# Patient Record
Sex: MALE | Race: OTHER | HISPANIC OR LATINO | URBAN - METROPOLITAN AREA
[De-identification: names, ages, dates, MRNs, and addresses within clinical notes are randomized per-mention and may not be internally consistent; named-entity substitution may affect disease eponyms.]

---

## 2017-07-19 ENCOUNTER — OUTPATIENT (OUTPATIENT)
Dept: OUTPATIENT SERVICES | Facility: HOSPITAL | Age: 67
LOS: 1 days | End: 2017-07-19
Payer: MEDICARE

## 2017-07-19 VITALS
TEMPERATURE: 98 F | HEIGHT: 65 IN | WEIGHT: 190.04 LBS | SYSTOLIC BLOOD PRESSURE: 128 MMHG | OXYGEN SATURATION: 98 % | RESPIRATION RATE: 16 BRPM | HEART RATE: 48 BPM | DIASTOLIC BLOOD PRESSURE: 70 MMHG

## 2017-07-19 DIAGNOSIS — J45.909 UNSPECIFIED ASTHMA, UNCOMPLICATED: ICD-10-CM

## 2017-07-19 DIAGNOSIS — K40.30 UNILATERAL INGUINAL HERNIA, WITH OBSTRUCTION, WITHOUT GANGRENE, NOT SPECIFIED AS RECURRENT: ICD-10-CM

## 2017-07-19 DIAGNOSIS — I44.7 LEFT BUNDLE-BRANCH BLOCK, UNSPECIFIED: ICD-10-CM

## 2017-07-19 DIAGNOSIS — Z98.890 OTHER SPECIFIED POSTPROCEDURAL STATES: Chronic | ICD-10-CM

## 2017-07-19 DIAGNOSIS — R06.83 SNORING: ICD-10-CM

## 2017-07-19 DIAGNOSIS — K40.90 UNILATERAL INGUINAL HERNIA, WITHOUT OBSTRUCTION OR GANGRENE, NOT SPECIFIED AS RECURRENT: ICD-10-CM

## 2017-07-19 LAB
BUN SERPL-MCNC: 15 MG/DL — SIGNIFICANT CHANGE UP (ref 7–23)
CALCIUM SERPL-MCNC: 9.8 MG/DL — SIGNIFICANT CHANGE UP (ref 8.4–10.5)
CHLORIDE SERPL-SCNC: 98 MMOL/L — SIGNIFICANT CHANGE UP (ref 98–107)
CO2 SERPL-SCNC: 28 MMOL/L — SIGNIFICANT CHANGE UP (ref 22–31)
CREAT SERPL-MCNC: 1.06 MG/DL — SIGNIFICANT CHANGE UP (ref 0.5–1.3)
GLUCOSE SERPL-MCNC: 105 MG/DL — HIGH (ref 70–99)
HCT VFR BLD CALC: 49.3 % — SIGNIFICANT CHANGE UP (ref 39–50)
HGB BLD-MCNC: 16.2 G/DL — SIGNIFICANT CHANGE UP (ref 13–17)
MCHC RBC-ENTMCNC: 26.7 PG — LOW (ref 27–34)
MCHC RBC-ENTMCNC: 32.9 % — SIGNIFICANT CHANGE UP (ref 32–36)
MCV RBC AUTO: 81.4 FL — SIGNIFICANT CHANGE UP (ref 80–100)
NRBC # FLD: 0 — SIGNIFICANT CHANGE UP
PLATELET # BLD AUTO: 216 K/UL — SIGNIFICANT CHANGE UP (ref 150–400)
PMV BLD: 11.1 FL — SIGNIFICANT CHANGE UP (ref 7–13)
POTASSIUM SERPL-MCNC: 3 MMOL/L — LOW (ref 3.5–5.3)
POTASSIUM SERPL-SCNC: 3 MMOL/L — LOW (ref 3.5–5.3)
RBC # BLD: 6.06 M/UL — HIGH (ref 4.2–5.8)
RBC # FLD: 14.4 % — SIGNIFICANT CHANGE UP (ref 10.3–14.5)
SODIUM SERPL-SCNC: 142 MMOL/L — SIGNIFICANT CHANGE UP (ref 135–145)
WBC # BLD: 9.32 K/UL — SIGNIFICANT CHANGE UP (ref 3.8–10.5)
WBC # FLD AUTO: 9.32 K/UL — SIGNIFICANT CHANGE UP (ref 3.8–10.5)

## 2017-07-19 PROCEDURE — 93010 ELECTROCARDIOGRAM REPORT: CPT

## 2017-07-19 NOTE — H&P PST ADULT - PMH
Asthma  h/o  Cerebral aneurysm  2001  Depression with anxiety  following anuerysm  GERD (gastroesophageal reflux disease)    Hypertension    Inguinal hernia  b/l  LBBB (left bundle branch block)    Prostate disease  BPH   7/2011 hot and cold fusion Asthma  h/o  Cerebral aneurysm  2001  Depression with anxiety  following anuerysm  GERD (gastroesophageal reflux disease)    Hypertension    Inguinal hernia  b/l  LBBB (left bundle branch block)    Obesity    Prostate disease  BPH   7/2011 hot and cold fusion

## 2017-07-19 NOTE — H&P PST ADULT - NEGATIVE CARDIOVASCULAR SYMPTOMS
no chest pain/no claudication/no dyspnea on exertion/no paroxysmal nocturnal dyspnea/no orthopnea/no palpitations/no peripheral edema

## 2017-07-19 NOTE — H&P PST ADULT - PROBLEM SELECTOR PLAN 1
scheduled b/l inguinal hernia on 7/26/2017.  preop instructions, gi prophylaxis & surgical soap given  pt verbalized understanding

## 2017-07-19 NOTE — H&P PST ADULT - LYMPHATIC
posterior cervical R/supraclavicular L/posterior cervical L/anterior cervical R/anterior cervical L/supraclavicular R

## 2017-07-19 NOTE — H&P PST ADULT - HISTORY OF PRESENT ILLNESS
66 y/o male presents for preop eval for scheduled b/l inguinal hernia on 7/26/2017.  pt states h/o lower back pain, recent imaging revealed b/l inguinal hernia & gallstones.

## 2017-07-19 NOTE — H&P PST ADULT - FAMILY HISTORY
Father  Still living? No  Family history of lung cancer, Age at diagnosis: Age Unknown     Mother  Still living? No  Family history of diabetes mellitus, Age at diagnosis: Age Unknown     Sibling  Still living? Yes, Estimated age: Age Unknown  Family history of diabetes mellitus, Age at diagnosis: Age Unknown  Family history of emphysema, Age at diagnosis: Age Unknown  Family history of prostate cancer, Age at diagnosis: Age Unknown  Family history of renal disease, Age at diagnosis: Age Unknown  Family history of stroke, Age at diagnosis: Age Unknown

## 2017-07-19 NOTE — H&P PST ADULT - PROBLEM SELECTOR PLAN 2
h/o LBBB, followed by Dr Longoria (cardiologist).  HR 48  cardiology eval recommended  pending copy of cardiology eval  pt instructed to take atenolol on morning of surgery. Pt instructed to confirm with cardiology.

## 2017-07-25 NOTE — ASU PATIENT PROFILE, ADULT - PMH
Asthma  h/o  Cerebral aneurysm  2001  Depression with anxiety  following anuerysm  GERD (gastroesophageal reflux disease)    Hypertension    Inguinal hernia  b/l  LBBB (left bundle branch block)    Obesity    Prostate disease  BPH   7/2011 hot and cold fusion

## 2017-07-26 ENCOUNTER — OUTPATIENT (OUTPATIENT)
Dept: OUTPATIENT SERVICES | Facility: HOSPITAL | Age: 67
LOS: 1 days | Discharge: HOME | End: 2017-07-26
Payer: MEDICARE

## 2017-07-26 ENCOUNTER — RESULT REVIEW (OUTPATIENT)
Age: 67
End: 2017-07-26

## 2017-07-26 ENCOUNTER — TRANSCRIPTION ENCOUNTER (OUTPATIENT)
Age: 67
End: 2017-07-26

## 2017-07-26 VITALS
SYSTOLIC BLOOD PRESSURE: 144 MMHG | OXYGEN SATURATION: 100 % | RESPIRATION RATE: 14 BRPM | DIASTOLIC BLOOD PRESSURE: 61 MMHG | HEART RATE: 58 BPM

## 2017-07-26 VITALS
WEIGHT: 190.04 LBS | HEART RATE: 54 BPM | OXYGEN SATURATION: 97 % | HEIGHT: 65 IN | DIASTOLIC BLOOD PRESSURE: 69 MMHG | RESPIRATION RATE: 12 BRPM | SYSTOLIC BLOOD PRESSURE: 132 MMHG | TEMPERATURE: 98 F

## 2017-07-26 DIAGNOSIS — K40.30 UNILATERAL INGUINAL HERNIA, WITH OBSTRUCTION, WITHOUT GANGRENE, NOT SPECIFIED AS RECURRENT: ICD-10-CM

## 2017-07-26 DIAGNOSIS — Z98.890 OTHER SPECIFIED POSTPROCEDURAL STATES: Chronic | ICD-10-CM

## 2017-07-26 LAB — POTASSIUM BLDV-SCNC: 3.7 MMOL/L — SIGNIFICANT CHANGE UP (ref 3.4–4.5)

## 2017-07-26 PROCEDURE — 88304 TISSUE EXAM BY PATHOLOGIST: CPT | Mod: 26

## 2017-07-26 RX ORDER — FLUTICASONE FUROATE AND VILANTEROL TRIFENATATE 100; 25 UG/1; UG/1
1 POWDER RESPIRATORY (INHALATION)
Qty: 0 | Refills: 0 | COMMUNITY

## 2017-07-26 RX ORDER — DOCUSATE SODIUM 100 MG
100 CAPSULE ORAL THREE TIMES A DAY
Qty: 0 | Refills: 0 | Status: DISCONTINUED | OUTPATIENT
Start: 2017-07-26 | End: 2017-07-27

## 2017-07-26 RX ORDER — OXYCODONE AND ACETAMINOPHEN 5; 325 MG/1; MG/1
1 TABLET ORAL EVERY 4 HOURS
Qty: 0 | Refills: 0 | Status: DISCONTINUED | OUTPATIENT
Start: 2017-07-26 | End: 2017-07-27

## 2017-07-26 RX ORDER — ERGOCALCIFEROL 1.25 MG/1
1 CAPSULE ORAL
Qty: 0 | Refills: 0 | COMMUNITY

## 2017-07-26 RX ORDER — SODIUM CHLORIDE 9 MG/ML
1000 INJECTION, SOLUTION INTRAVENOUS
Qty: 0 | Refills: 0 | Status: DISCONTINUED | OUTPATIENT
Start: 2017-07-26 | End: 2017-07-27

## 2017-07-26 RX ORDER — OMEPRAZOLE 10 MG/1
1 CAPSULE, DELAYED RELEASE ORAL
Qty: 0 | Refills: 0 | COMMUNITY

## 2017-07-26 RX ORDER — CHLORTHALIDONE 50 MG
1 TABLET ORAL
Qty: 0 | Refills: 0 | COMMUNITY

## 2017-07-26 RX ORDER — ESCITALOPRAM OXALATE 10 MG/1
1 TABLET, FILM COATED ORAL
Qty: 0 | Refills: 0 | COMMUNITY

## 2017-07-26 RX ORDER — OXYCODONE AND ACETAMINOPHEN 5; 325 MG/1; MG/1
2 TABLET ORAL EVERY 6 HOURS
Qty: 0 | Refills: 0 | Status: DISCONTINUED | OUTPATIENT
Start: 2017-07-26 | End: 2017-07-27

## 2017-07-26 RX ORDER — DOCUSATE SODIUM 100 MG
1 CAPSULE ORAL
Qty: 0 | Refills: 0 | COMMUNITY
Start: 2017-07-26

## 2017-07-26 RX ORDER — ATENOLOL 25 MG/1
1 TABLET ORAL
Qty: 0 | Refills: 0 | COMMUNITY

## 2017-07-26 NOTE — ASU DISCHARGE PLAN (ADULT/PEDIATRIC). - DRIVING
Do not drive if taking prescription pain medications Do not drive if taking prescription pain medications/No

## 2017-07-26 NOTE — ASU DISCHARGE PLAN (ADULT/PEDIATRIC). - NOTIFY
Pain not relieved by Medications/Unable to Urinate/Bleeding that does not stop/Fever greater than 101

## 2017-07-26 NOTE — ASU DISCHARGE PLAN (ADULT/PEDIATRIC). - NURSING INSTRUCTIONS
You were given 1000mg IV Tylenol for pain management.  Please DO NOT take any Tylenol containing products, such as  Vicodin, Percocet, Excedrin, many cold preparations for the next 8 hours (until 1030p).  DO NOT EXCEED 3000MG OF TYLENOL OVER 24 HOURS.   You were given Toradol for pain management. Please DO Not take Motrin/Ibuprofen/Advil/Aleve (NSAIDS) for the next 6 hours (Until 10p)

## 2017-07-26 NOTE — ASU DISCHARGE PLAN (ADULT/PEDIATRIC). - ACTIVITY LEVEL
No strenuous exercise or swimming pool no heavy lifting/no sports/gym/No strenuous exercise or swimming pool

## 2017-07-26 NOTE — ASU DISCHARGE PLAN (ADULT/PEDIATRIC). - ITEMS TO FOLLOWUP WITH YOUR PHYSICIAN'S
Take Colace (an over-the-counter drug) 100mg three times a day to prevent/help with constipation while taking pain killer. If no bowel movement in 3 days, take Miralax.     Put ice pack on both groin for 30 min every hour while awake until tomorrow.

## 2017-07-26 NOTE — ASU DISCHARGE PLAN (ADULT/PEDIATRIC). - MEDICATION SUMMARY - MEDICATIONS TO TAKE
I will START or STAY ON the medications listed below when I get home from the hospital:    oxycodone-acetaminophen 5mg-325mg oral tablet  -- 1 tab(s) by mouth every 3 to 4 hours PRN MDD:8 tablets  -- Caution federal law prohibits the transfer of this drug to any person other  than the person for whom it was prescribed.  May cause drowsiness.  Alcohol may intensify this effect.  Use care when operating dangerous machinery.  This prescription cannot be refilled.  This product contains acetaminophen.  Do not use  with any other product containing acetaminophen to prevent possible liver damage.  Using more of this medication than prescribed may cause serious breathing problems.    -- Indication: For Post-OP pain    escitalopram 20 mg oral tablet  -- 1 tab(s) by mouth once a day  -- Indication: For Home med    atenolol 25 mg oral tablet  -- 1 tab(s) by mouth once a day  -- Indication: For Home med    Breo Ellipta 200 mcg-25 mcg/inh inhalation powder  -- 1 puff(s) inhaled once a day, As Needed  -- Indication: For Home med    chlorthalidone 25 mg oral tablet  -- 1 tab(s) by mouth once a day  -- Indication: For Home med    amoxicillin-clavulanate 875 mg-125 mg oral tablet  -- 1 tab(s) by mouth 2 times a day  -- Finish all this medication unless otherwise directed by prescriber.  Take with food or milk.    -- Indication: For Home med    omeprazole 40 mg oral delayed release capsule  -- 1 cap(s) by mouth once a day  -- Indication: For Home med    Vitamin B Complex 100  -- 1 tab(s) by mouth once a day  -- Indication: For Home med    Vitamin D2  -- 1 tab(s) by mouth once a day  -- Indication: For Home med I will START or STAY ON the medications listed below when I get home from the hospital:    oxycodone-acetaminophen 5mg-325mg oral tablet  -- 1 tab(s) by mouth every 3 to 4 hours PRN MDD:8 tablets  -- Caution federal law prohibits the transfer of this drug to any person other  than the person for whom it was prescribed.  May cause drowsiness.  Alcohol may intensify this effect.  Use care when operating dangerous machinery.  This prescription cannot be refilled.  This product contains acetaminophen.  Do not use  with any other product containing acetaminophen to prevent possible liver damage.  Using more of this medication than prescribed may cause serious breathing problems.    -- Indication: For Post-OP pain    escitalopram 20 mg oral tablet  -- 1 tab(s) by mouth once a day  -- Indication: For Home med    atenolol 25 mg oral tablet  -- 1 tab(s) by mouth once a day  -- Indication: For Home med    Breo Ellipta 200 mcg-25 mcg/inh inhalation powder  -- 1 puff(s) inhaled once a day, As Needed  -- Indication: For Home med    chlorthalidone 25 mg oral tablet  -- 1 tab(s) by mouth once a day  -- Indication: For Home med    docusate sodium 100 mg oral capsule  -- 1 cap(s) by mouth 3 times a day  -- Indication: For stool softner while taking Percocet    amoxicillin-clavulanate 875 mg-125 mg oral tablet  -- 1 tab(s) by mouth 2 times a day  -- Finish all this medication unless otherwise directed by prescriber.  Take with food or milk.    -- Indication: For Home med    omeprazole 40 mg oral delayed release capsule  -- 1 cap(s) by mouth once a day  -- Indication: For Home med    Vitamin B Complex 100  -- 1 tab(s) by mouth once a day  -- Indication: For Home med    Vitamin D2  -- 1 tab(s) by mouth once a day  -- Indication: For Home med

## 2017-08-01 LAB — SURGICAL PATHOLOGY STUDY: SIGNIFICANT CHANGE UP

## 2017-09-06 ENCOUNTER — EMERGENCY (EMERGENCY)
Facility: HOSPITAL | Age: 67
LOS: 1 days | Discharge: HOME | End: 2017-09-06
Attending: EMERGENCY MEDICINE | Admitting: EMERGENCY MEDICINE
Payer: MEDICARE

## 2017-09-06 VITALS
TEMPERATURE: 98 F | HEART RATE: 61 BPM | RESPIRATION RATE: 18 BRPM | SYSTOLIC BLOOD PRESSURE: 179 MMHG | DIASTOLIC BLOOD PRESSURE: 75 MMHG | OXYGEN SATURATION: 98 %

## 2017-09-06 VITALS
HEART RATE: 57 BPM | OXYGEN SATURATION: 99 % | RESPIRATION RATE: 14 BRPM | DIASTOLIC BLOOD PRESSURE: 53 MMHG | SYSTOLIC BLOOD PRESSURE: 132 MMHG

## 2017-09-06 DIAGNOSIS — Z98.890 OTHER SPECIFIED POSTPROCEDURAL STATES: Chronic | ICD-10-CM

## 2017-09-06 LAB
ALBUMIN SERPL ELPH-MCNC: 3.9 G/DL — SIGNIFICANT CHANGE UP (ref 3.3–5)
ALP SERPL-CCNC: 71 U/L — SIGNIFICANT CHANGE UP (ref 40–120)
ALT FLD-CCNC: 22 U/L — SIGNIFICANT CHANGE UP (ref 4–41)
AST SERPL-CCNC: 25 U/L — SIGNIFICANT CHANGE UP (ref 4–40)
BASOPHILS # BLD AUTO: 0.04 K/UL — SIGNIFICANT CHANGE UP (ref 0–0.2)
BASOPHILS NFR BLD AUTO: 0.5 % — SIGNIFICANT CHANGE UP (ref 0–2)
BILIRUB SERPL-MCNC: 0.4 MG/DL — SIGNIFICANT CHANGE UP (ref 0.2–1.2)
BUN SERPL-MCNC: 16 MG/DL — SIGNIFICANT CHANGE UP (ref 7–23)
CALCIUM SERPL-MCNC: 9.4 MG/DL — SIGNIFICANT CHANGE UP (ref 8.4–10.5)
CHLORIDE SERPL-SCNC: 97 MMOL/L — LOW (ref 98–107)
CK MB BLD-MCNC: 1.82 NG/ML — SIGNIFICANT CHANGE UP (ref 1–6.6)
CK MB BLD-MCNC: SIGNIFICANT CHANGE UP (ref 0–2.5)
CK SERPL-CCNC: 130 U/L — SIGNIFICANT CHANGE UP (ref 30–200)
CO2 SERPL-SCNC: 27 MMOL/L — SIGNIFICANT CHANGE UP (ref 22–31)
CREAT SERPL-MCNC: 0.93 MG/DL — SIGNIFICANT CHANGE UP (ref 0.5–1.3)
EOSINOPHIL # BLD AUTO: 0.09 K/UL — SIGNIFICANT CHANGE UP (ref 0–0.5)
EOSINOPHIL NFR BLD AUTO: 1.2 % — SIGNIFICANT CHANGE UP (ref 0–6)
GLUCOSE SERPL-MCNC: 106 MG/DL — HIGH (ref 70–99)
HCT VFR BLD CALC: 46 % — SIGNIFICANT CHANGE UP (ref 39–50)
HGB BLD-MCNC: 15.3 G/DL — SIGNIFICANT CHANGE UP (ref 13–17)
IMM GRANULOCYTES # BLD AUTO: 0.05 # — SIGNIFICANT CHANGE UP
IMM GRANULOCYTES NFR BLD AUTO: 0.7 % — SIGNIFICANT CHANGE UP (ref 0–1.5)
LYMPHOCYTES # BLD AUTO: 1.79 K/UL — SIGNIFICANT CHANGE UP (ref 1–3.3)
LYMPHOCYTES # BLD AUTO: 24.1 % — SIGNIFICANT CHANGE UP (ref 13–44)
MCHC RBC-ENTMCNC: 27.2 PG — SIGNIFICANT CHANGE UP (ref 27–34)
MCHC RBC-ENTMCNC: 33.3 % — SIGNIFICANT CHANGE UP (ref 32–36)
MCV RBC AUTO: 81.7 FL — SIGNIFICANT CHANGE UP (ref 80–100)
MONOCYTES # BLD AUTO: 0.65 K/UL — SIGNIFICANT CHANGE UP (ref 0–0.9)
MONOCYTES NFR BLD AUTO: 8.8 % — SIGNIFICANT CHANGE UP (ref 2–14)
NEUTROPHILS # BLD AUTO: 4.8 K/UL — SIGNIFICANT CHANGE UP (ref 1.8–7.4)
NEUTROPHILS NFR BLD AUTO: 64.7 % — SIGNIFICANT CHANGE UP (ref 43–77)
NRBC # FLD: 0 — SIGNIFICANT CHANGE UP
PLATELET # BLD AUTO: 216 K/UL — SIGNIFICANT CHANGE UP (ref 150–400)
PMV BLD: 10.8 FL — SIGNIFICANT CHANGE UP (ref 7–13)
POTASSIUM SERPL-MCNC: 3.7 MMOL/L — SIGNIFICANT CHANGE UP (ref 3.5–5.3)
POTASSIUM SERPL-SCNC: 3.7 MMOL/L — SIGNIFICANT CHANGE UP (ref 3.5–5.3)
PROT SERPL-MCNC: 7.4 G/DL — SIGNIFICANT CHANGE UP (ref 6–8.3)
RBC # BLD: 5.63 M/UL — SIGNIFICANT CHANGE UP (ref 4.2–5.8)
RBC # FLD: 14.5 % — SIGNIFICANT CHANGE UP (ref 10.3–14.5)
SODIUM SERPL-SCNC: 139 MMOL/L — SIGNIFICANT CHANGE UP (ref 135–145)
TROPONIN T SERPL-MCNC: < 0.06 NG/ML — SIGNIFICANT CHANGE UP (ref 0–0.06)
TSH SERPL-MCNC: 1.85 UIU/ML — SIGNIFICANT CHANGE UP (ref 0.27–4.2)
WBC # BLD: 7.42 K/UL — SIGNIFICANT CHANGE UP (ref 3.8–10.5)
WBC # FLD AUTO: 7.42 K/UL — SIGNIFICANT CHANGE UP (ref 3.8–10.5)

## 2017-09-06 PROCEDURE — 99284 EMERGENCY DEPT VISIT MOD MDM: CPT | Mod: 25

## 2017-09-06 PROCEDURE — 93010 ELECTROCARDIOGRAM REPORT: CPT

## 2017-09-06 RX ORDER — ACETAMINOPHEN 500 MG
650 TABLET ORAL ONCE
Qty: 0 | Refills: 0 | Status: COMPLETED | OUTPATIENT
Start: 2017-09-06 | End: 2017-09-06

## 2017-09-06 RX ORDER — LIDOCAINE 4 G/100G
1 CREAM TOPICAL ONCE
Qty: 0 | Refills: 0 | Status: COMPLETED | OUTPATIENT
Start: 2017-09-06 | End: 2017-09-06

## 2017-09-06 RX ADMIN — Medication 650 MILLIGRAM(S): at 15:45

## 2017-09-06 RX ADMIN — LIDOCAINE 1 PATCH: 4 CREAM TOPICAL at 15:45

## 2017-09-06 RX ADMIN — Medication 650 MILLIGRAM(S): at 16:10

## 2017-09-06 NOTE — ED ADULT TRIAGE NOTE - CHIEF COMPLAINT QUOTE
p/t c/o of lower back pain for past one week denies any trauma p/t ambulatory no neuro deficits noted

## 2017-09-06 NOTE — ED PROVIDER NOTE - MEDICAL DECISION MAKING DETAILS
Right sided low back pain likely MSK, Tylenol, Lidoderm patch, reassess.  Episodes of feeling anxious and shortness of breath x several weeks likely Anxiety will check labs, EKG.  Follow up PMD.

## 2017-09-06 NOTE — ED PROVIDER NOTE - PLAN OF CARE
Follow up with your Doctor in 1-2 days.    Heat to back.    Take Tylenol 650mg orally every 6 hours as needed for pain.  Return to the ER for any persistent/worsening or new symptoms, weakness, numbness, difficulty urinating or any concerning symptoms.

## 2017-09-06 NOTE — ED PROVIDER NOTE - OBJECTIVE STATEMENT
66 y/o male with a hx of HTN, Depression, brain aneurysm(s/p clipping) presents to the ER c/o right sided low back x 1 week.  Pt also reports several weeks of anxiety attacks during which pt becomes anxious, short of breath, cold sweats; symptoms resolve after a few minutes.  Pt denies chest pain, fevers, chills, recent travel, abdominal pain, fecal/urinary incontinence, feeling depressed, SI/HI/VH/AH, weakness, numbness.  Pt states he had diarrhea earlier in the week which resolved.  Pt reports relief at home with aleve.

## 2017-09-06 NOTE — ED PROVIDER NOTE - CARE PLAN
Principal Discharge DX:	Back pain  Instructions for follow-up, activity and diet:	Follow up with your Doctor in 1-2 days.    Heat to back.    Take Tylenol 650mg orally every 6 hours as needed for pain.  Return to the ER for any persistent/worsening or new symptoms, weakness, numbness, difficulty urinating or any concerning symptoms.

## 2017-09-06 NOTE — ED PROVIDER NOTE - ATTENDING CONTRIBUTION TO CARE
I , April Yun M.D. have examined the patient and confirmed the essential components of the history, physical examination, diagnosis, and treatment plan. I agree with the patient's care as documented by the PA and amended herein by me. See note above for complete details of service.  68 yo M multiple PMHx pw R low back pain x 1 week. Worse with positional changes. N bowel/bladder symptoms, no focal neuro deficits, no ab pain or  complaints. Exam + reproducible R lumbar ttp. No gross focal motor or sensory deficits. Plan symptom control, reassess. Likely MSK pain.

## 2017-10-02 ENCOUNTER — APPOINTMENT (OUTPATIENT)
Dept: ORTHOPEDIC SURGERY | Facility: CLINIC | Age: 67
End: 2017-10-02
Payer: MEDICARE

## 2017-10-02 VITALS — HEIGHT: 67 IN | HEART RATE: 55 BPM | SYSTOLIC BLOOD PRESSURE: 146 MMHG | DIASTOLIC BLOOD PRESSURE: 76 MMHG

## 2017-10-02 VITALS — HEIGHT: 66 IN | BODY MASS INDEX: 29.73 KG/M2 | WEIGHT: 185 LBS

## 2017-10-02 PROCEDURE — 72100 X-RAY EXAM L-S SPINE 2/3 VWS: CPT

## 2017-10-02 PROCEDURE — 72070 X-RAY EXAM THORAC SPINE 2VWS: CPT

## 2017-10-02 PROCEDURE — 99213 OFFICE O/P EST LOW 20 MIN: CPT

## 2017-11-27 ENCOUNTER — APPOINTMENT (OUTPATIENT)
Dept: ORTHOPEDIC SURGERY | Facility: CLINIC | Age: 67
End: 2017-11-27
Payer: MEDICARE

## 2017-11-27 VITALS
WEIGHT: 185 LBS | SYSTOLIC BLOOD PRESSURE: 125 MMHG | DIASTOLIC BLOOD PRESSURE: 69 MMHG | BODY MASS INDEX: 29.73 KG/M2 | HEIGHT: 66 IN | HEART RATE: 51 BPM

## 2017-11-27 DIAGNOSIS — M47.816 SPONDYLOSIS W/OUT MYELOPATHY OR RADICULOPATHY, LUMBAR REGION: ICD-10-CM

## 2017-11-27 DIAGNOSIS — M47.814 SPONDYLOSIS W/OUT MYELOPATHY OR RADICULOPATHY, THORACIC REGION: ICD-10-CM

## 2017-11-27 PROCEDURE — 99213 OFFICE O/P EST LOW 20 MIN: CPT

## 2017-11-27 RX ORDER — MELOXICAM 15 MG/1
15 TABLET ORAL DAILY
Qty: 30 | Refills: 1 | Status: ACTIVE | COMMUNITY
Start: 2017-10-02 | End: 1900-01-01

## 2018-07-16 PROBLEM — J45.909 UNSPECIFIED ASTHMA, UNCOMPLICATED: Chronic | Status: ACTIVE | Noted: 2017-07-19

## 2019-10-01 NOTE — ASU PREOP CHECKLIST - LAST DOSE WITHIN LAST 24HRS
Yes
Acid reflux disease    Alcohol abuse  LAST DRINK APPROX ONE YEAR AGO  Constipation    History of COPD    HTN (hypertension)    Schizo affective schizophrenia  DEPRESSSION ANXIETY  Sciatic leg pain  RIGHT

## 2020-08-06 ENCOUNTER — HOSPITAL ENCOUNTER (OUTPATIENT)
Age: 70
Setting detail: SPECIMEN
Discharge: HOME | End: 2020-08-06
Payer: MEDICARE

## 2020-08-06 LAB
ALBUMIN SERPL-MCNC: 4.1 G/DL (ref 3.5–5.2)
ALBUMIN/GLOBULIN RATIO: 1.4 (ref 1–2.5)
ALP BLD-CCNC: 67 U/L (ref 40–129)
ALT SERPL-CCNC: 18 U/L (ref 5–41)
ANION GAP SERPL CALCULATED.3IONS-SCNC: 16 MMOL/L (ref 9–17)
AST SERPL-CCNC: 22 U/L
BILIRUB SERPL-MCNC: 0.27 MG/DL (ref 0.3–1.2)
BUN BLDV-MCNC: 18 MG/DL (ref 8–23)
BUN/CREAT BLD: ABNORMAL (ref 9–20)
CALCIUM SERPL-MCNC: 9.6 MG/DL (ref 8.6–10.4)
CHLORIDE BLD-SCNC: 100 MMOL/L (ref 98–107)
CHOLESTEROL/HDL RATIO: 4.2
CHOLESTEROL: 123 MG/DL
CO2: 25 MMOL/L (ref 20–31)
CREAT SERPL-MCNC: 0.89 MG/DL (ref 0.7–1.2)
GFR AFRICAN AMERICAN: >60 ML/MIN
GFR NON-AFRICAN AMERICAN: >60 ML/MIN
GFR SERPL CREATININE-BSD FRML MDRD: ABNORMAL ML/MIN/{1.73_M2}
GFR SERPL CREATININE-BSD FRML MDRD: ABNORMAL ML/MIN/{1.73_M2}
GLUCOSE BLD-MCNC: 135 MG/DL (ref 70–99)
HCT VFR BLD CALC: 48.3 % (ref 40.7–50.3)
HDLC SERPL-MCNC: 29 MG/DL
HEMOGLOBIN: 15.3 G/DL (ref 13–17)
LDL CHOLESTEROL: 60 MG/DL (ref 0–130)
MCH RBC QN AUTO: 26.2 PG (ref 25.2–33.5)
MCHC RBC AUTO-ENTMCNC: 31.7 G/DL (ref 28.4–34.8)
MCV RBC AUTO: 82.8 FL (ref 82.6–102.9)
NRBC AUTOMATED: 0 PER 100 WBC
PDW BLD-RTO: 14.7 % (ref 11.8–14.4)
PLATELET # BLD: 254 K/UL (ref 138–453)
PMV BLD AUTO: 11 FL (ref 8.1–13.5)
POTASSIUM SERPL-SCNC: 3.8 MMOL/L (ref 3.7–5.3)
PROSTATE SPECIFIC ANTIGEN: 2.49 UG/L
RBC # BLD: 5.83 M/UL (ref 4.21–5.77)
SODIUM BLD-SCNC: 141 MMOL/L (ref 135–144)
THYROXINE, FREE: 1.25 NG/DL (ref 0.93–1.7)
TOTAL PROTEIN: 7.1 G/DL (ref 6.4–8.3)
TRIGL SERPL-MCNC: 170 MG/DL
TSH SERPL DL<=0.05 MIU/L-ACNC: 1.98 MIU/L (ref 0.3–5)
VLDLC SERPL CALC-MCNC: ABNORMAL MG/DL (ref 1–30)
WBC # BLD: 7 K/UL (ref 3.5–11.3)

## 2020-08-07 LAB — VITAMIN D 25-HYDROXY: 23 NG/ML (ref 30–100)

## 2020-12-11 NOTE — H&P PST ADULT - ENERGY EXPENDITURE (METS)
Start valsartan daily  Check BP at home and record.  Blood test in 2 weeks.   Return to cardiac rehab  Follow up with Dr. Ellis in 2-3 months    
4

## 2021-07-31 NOTE — H&P PST ADULT - PRO PAIN EXPRESSION
EXAM: ULTRASOUND SOFT TISSUE NECK



CLINICAL HISTORY: Localized swelling, lump in the neck



COMPARISON: None available.



TECHNIQUE:  Ultrasound examination of the right posterior neck base was performed.



FINDINGS:

No suspicious lymphadenopathy. No cystic or solid mass in the area of interest as indicated by the pa
tient.



IMPRESSION:

No significant abnormality identified in the area of interest indicated by the patient within the pos
terior right neck base. Further evaluation with CT neck with contrast could be of benefit.



Electronically signed by: Sara Davis MD (7/31/2021 10:15 AM) [id number] none

## 2021-11-23 NOTE — H&P PST ADULT - SPO2 (%)
seen and examined   24 h events noted   no new complaints         PAST HISTORY  --------------------------------------------------------------------------------  No significant changes to PMH, PSH, FHx, SHx, unless otherwise noted    ALLERGIES & MEDICATIONS  --------------------------------------------------------------------------------  Allergies    No Known Allergies    Intolerances      Standing Inpatient Medications  amLODIPine   Tablet 5 milliGRAM(s) Oral daily  apixaban 5 milliGRAM(s) Oral two times a day  atorvastatin 40 milliGRAM(s) Oral at bedtime  BACItracin   Ointment 1 Application(s) Topical two times a day  calcitriol   Capsule 0.5 MICROGram(s) Oral <User Schedule>  calcium acetate 1334 milliGRAM(s) Oral four times a day with meals  clopidogrel Tablet 75 milliGRAM(s) Oral daily  cyanocobalamin 1000 MICROGram(s) Oral daily  dextrose 40% Gel 15 Gram(s) Oral once  dextrose 5%. 1000 milliLiter(s) IV Continuous <Continuous>  dextrose 5%. 1000 milliLiter(s) IV Continuous <Continuous>  dextrose 50% Injectable 25 Gram(s) IV Push once  dextrose 50% Injectable 12.5 Gram(s) IV Push once  dextrose 50% Injectable 25 Gram(s) IV Push once  gabapentin 300 milliGRAM(s) Oral daily  glucagon  Injectable 1 milliGRAM(s) IntraMuscular once  influenza   Vaccine 0.5 milliLiter(s) IntraMuscular once  insulin glargine Injectable (LANTUS) 21 Unit(s) SubCutaneous at bedtime  insulin lispro (ADMELOG) corrective regimen sliding scale   SubCutaneous three times a day before meals  insulin lispro Injectable (ADMELOG) 7 Unit(s) SubCutaneous three times a day before meals  iron sucrose Injectable 100 milliGRAM(s) IV Push <User Schedule>  multivitamin 1 Tablet(s) Oral daily    PRN Inpatient Medications  simethicone 80 milliGRAM(s) Chew four times a day PRN          VITALS/PHYSICAL EXAM  --------------------------------------------------------------------------------  T(C): 35.8 (11-23-21 @ 05:00), Max: 36.6 (11-22-21 @ 21:29)  HR: 95 (11-23-21 @ 05:00) (88 - 95)  BP: 138/67 (11-23-21 @ 05:00) (138/67 - 147/70)  RR: 18 (11-23-21 @ 05:00) (18 - 19)  SpO2: --  Wt(kg): --        11-22-21 @ 07:01  -  11-23-21 @ 07:00  --------------------------------------------------------  IN: 200 mL / OUT: 450 mL / NET: -250 mL      Physical Exam:  	Gen: NAD,  	Pulm: CTA B/L  	CV: S1S2; no rub  	Abd: +distended  	Vascular access: tesio     LABS/STUDIES  --------------------------------------------------------------------------------              9.7    7.00  >-----------<  186      [11-22-21 @ 07:04]              31.2     134  |  98  |  82  ----------------------------<  155      [11-22-21 @ 22:09]  5.0   |  18  |  6.3        Ca     7.8     [11-22-21 @ 22:09]      Mg     1.7     [11-22-21 @ 07:04]        Creatinine Trend:  SCr 6.3 [11-22 @ 22:09]  SCr 6.2 [11-22 @ 07:04]  SCr 6.2 [11-20 @ 04:30]  SCr 5.7 [11-19 @ 06:00]  SCr 5.0 [11-17 @ 07:36]        PTH -- (Ca 8.1)      [11-16-21 @ 10:33]   512    HBsAb Nonreact      [11-13-21 @ 09:30]  HBsAg Nonreact      [11-08-21 @ 15:44]  HCV 0.12, Nonreact      [11-08-21 @ 15:44]     98

## 2022-02-09 ENCOUNTER — HOSPITAL ENCOUNTER (OUTPATIENT)
Age: 72
Setting detail: SPECIMEN
Discharge: HOME | End: 2022-02-09

## 2022-02-10 LAB
ALBUMIN SERPL-MCNC: 4.1 G/DL (ref 3.5–5.2)
ALBUMIN/GLOBULIN RATIO: 1.2 (ref 1–2.5)
ALP BLD-CCNC: 92 U/L (ref 40–129)
ALT SERPL-CCNC: 14 U/L (ref 5–41)
ANION GAP SERPL CALCULATED.3IONS-SCNC: 15 MMOL/L (ref 9–17)
AST SERPL-CCNC: 28 U/L
BILIRUB SERPL-MCNC: 0.3 MG/DL (ref 0.3–1.2)
BUN BLDV-MCNC: 14 MG/DL (ref 8–23)
BUN/CREAT BLD: ABNORMAL (ref 9–20)
CALCIUM SERPL-MCNC: 9.5 MG/DL (ref 8.6–10.4)
CHLORIDE BLD-SCNC: 106 MMOL/L (ref 98–107)
CHOLESTEROL/HDL RATIO: 3
CHOLESTEROL: 86 MG/DL
CO2: 22 MMOL/L (ref 20–31)
CREAT SERPL-MCNC: 0.85 MG/DL (ref 0.7–1.2)
GFR AFRICAN AMERICAN: >60 ML/MIN
GFR NON-AFRICAN AMERICAN: >60 ML/MIN
GFR SERPL CREATININE-BSD FRML MDRD: ABNORMAL ML/MIN/{1.73_M2}
GFR SERPL CREATININE-BSD FRML MDRD: ABNORMAL ML/MIN/{1.73_M2}
GLUCOSE BLD-MCNC: 104 MG/DL (ref 70–99)
HCT VFR BLD CALC: 43.8 % (ref 40.7–50.3)
HDLC SERPL-MCNC: 29 MG/DL
HEMOGLOBIN: 14 G/DL (ref 13–17)
LDL CHOLESTEROL: 37 MG/DL (ref 0–130)
MCH RBC QN AUTO: 26.7 PG (ref 25.2–33.5)
MCHC RBC AUTO-ENTMCNC: 32 G/DL (ref 28.4–34.8)
MCV RBC AUTO: 83.4 FL (ref 82.6–102.9)
NRBC AUTOMATED: 0 PER 100 WBC
PDW BLD-RTO: 14 % (ref 11.8–14.4)
PLATELET # BLD: 291 K/UL (ref 138–453)
PMV BLD AUTO: 10.9 FL (ref 8.1–13.5)
POTASSIUM SERPL-SCNC: 3.7 MMOL/L (ref 3.7–5.3)
PROSTATE SPECIFIC ANTIGEN: 3.75 UG/L
RBC # BLD: 5.25 M/UL (ref 4.21–5.77)
SODIUM BLD-SCNC: 143 MMOL/L (ref 135–144)
THYROXINE, FREE: 1.29 NG/DL (ref 0.93–1.7)
TOTAL PROTEIN: 7.5 G/DL (ref 6.4–8.3)
TRIGL SERPL-MCNC: 99 MG/DL
TSH SERPL DL<=0.05 MIU/L-ACNC: 1.76 MIU/L (ref 0.3–5)
VLDLC SERPL CALC-MCNC: ABNORMAL MG/DL (ref 1–30)
WBC # BLD: 10.5 K/UL (ref 3.5–11.3)

## 2022-03-03 NOTE — H&P PST ADULT - PROBLEM SELECTOR PLAN 4
Subjective:       Patient ID: Ozzy Henson is a 71 y.o. male.    Chief Complaint:    FOLLOW UP ON RECURRENT INFECTIONS AND PERENNIAL ALLERGIC RHINITIS    HPI:     male, 71 year old with a long standing history of recurrent sinusitis and bronchitis. Immune work up was done over 25 years ago. Mr. Henson was immunized with multiple PPSV- 23 and PCV- 13 VACCINES.LAST PCV- 13 was at the ofice of PCP , Dr DONALD BARCENAS IN THE FALL OF 2018.  I do not have the vaccination response to the PCV- 13.    HE HAD, SINCE CHILDHOOD HAD YEAR ROUND NASAL AND EYE ALLERGIES. ALLERGY WORK UP WAS DONE 30 YEARS AGO.  He had allergies to non pollens and pollens.   AIT- SCIT was successfully administered for decades since early 1990s till May 2020.    Medical RX for allergies--- wotrking-- Flonase and Azelastine nose sprys help  Chronic dry mouth and chronic parotitis    Regularly follows up with his PCP  Cardiology--- will follow up-- Hollis Case MD  History of having Left calf DVT and RML PE.    Recently -- 2022 -- had prostatectomy for Ca prostate Stage 1- C, and lymph nodes disesection and seminal vesicles removal by urologist   Eduardo Lima MD IN FEBRUARY 2020. WILL FOLLOW UP WITH HIM AND FELLOW UROLOGIST TOPHER BHAKTA MD  and PCP Dr. Donald Barcenas        Patient has no known allergies.     Past Medical History:   Diagnosis Date    Perennial allergic rhinitis 7/8/2021       Family History   Problem Relation Age of Onset    Allergies Sister        Environmental History: Dust Mite Controls: Dust mite controls are already in place.     Review of Systems   Constitutional: Positive for fatigue. Negative for fever.   HENT: Positive for congestion, postnasal drip, rhinorrhea and sneezing. Negative for ear pain, sinus pressure, sinus pain and sore throat.    Eyes: Positive for itching. Negative for redness.   Respiratory: Negative.  Negative for cough, chest tightness, shortness of breath and wheezing.    Cardiovascular:  Negative.  Negative for chest pain.   Gastrointestinal: Negative.  Negative for nausea.   Endocrine: Negative.  Negative for cold intolerance.   Genitourinary: Negative.  Negative for frequency.   Musculoskeletal: Negative.  Negative for myalgias.   Skin: Negative.  Negative for rash.   Allergic/Immunologic: Positive for environmental allergies. Negative for food allergies and immunocompromised state.   Neurological: Negative.  Negative for dizziness and headaches.   Hematological: Negative.  Negative for adenopathy.   Psychiatric/Behavioral: Negative.  Negative for sleep disturbance.       Objective:     There were no vitals taken for this visit.    Physical Exam  Vitals and nursing note reviewed.   Constitutional:       Appearance: Normal appearance. He is normal weight.   HENT:      Head: Normocephalic and atraumatic.      Right Ear: Tympanic membrane, ear canal and external ear normal.      Left Ear: Tympanic membrane, ear canal and external ear normal.      Nose: Congestion and rhinorrhea present.      Mouth/Throat:      Mouth: Mucous membranes are moist.      Pharynx: Oropharynx is clear.   Eyes:      Extraocular Movements: Extraocular movements intact.      Conjunctiva/sclera: Conjunctivae normal.      Pupils: Pupils are equal, round, and reactive to light.   Cardiovascular:      Rate and Rhythm: Normal rate and regular rhythm.      Pulses: Normal pulses.      Heart sounds: Normal heart sounds.   Pulmonary:      Effort: Pulmonary effort is normal.      Breath sounds: Normal breath sounds.   Abdominal:      General: Abdomen is flat. Bowel sounds are normal.      Palpations: Abdomen is soft.   Musculoskeletal:         General: Normal range of motion.      Cervical back: Normal range of motion and neck supple.   Skin:     General: Skin is warm and dry.      Capillary Refill: Capillary refill takes less than 2 seconds.   Neurological:      General: No focal deficit present.      Mental Status: He is alert and  oriented to person, place, and time. Mental status is at baseline.   Psychiatric:         Mood and Affect: Mood normal.         Behavior: Behavior normal.         Thought Content: Thought content normal.         Judgment: Judgment normal.           Assessment:      1. Recurrent sinusitis    2. Perennial allergic rhinitis with seasonal variation    3. Bronchitis    4. Gastroesophageal reflux disease without esophagitis    5. Chronic parotitis    6. CAITLYN (obstructive sleep apnea)    7. Xerostomia    8       Had surgery for prostate cancer by Eduardo Lima MD and other urologistist Jose Ramon Walker MD. Will do monthly PSAs    Plan:       No longer on AIT- SCIT-- stopped in May 2020  Re emphasized environmental control measures  Azelastine 137 mcg and / or Flonase 50 mcg--- two sprays per nares qd or bid.  Pantaprazole 40 mg -- half hour before supper  PRN Singulair for nasal congestion  Zyrtec 10 mg qdWas immunized with PPSV- 23 X TWO-- October 2006 and 2020-- May monitor post titers  Had PCV- 13 IN THE FALL OF 2018--- Vahid Barcenas. May get PCV-0 20 in November 2022.  May check d- dimer and consider doppler imaging of the calf veins  Treat all infections.  Annual influenza vaccinations  COVID vaccine - 3 doses  CPAP for CAITLYN  Follow up with Vahid Barcenas MD, PCP  Follow up in September 2022                    Problems Address                                                 Amount and/or Complexity                                                                      Risk       3           [] 2 or more self-limited or minor problems                      [] Limited                                                                        [] Low                  [] 1 stable chronic illness                                                  Any combination of the two                                               OTC drugs                  []Acute, uncomplicated illness or injury                            Review of prior external  notes from unique source           Minor surgery with no risk factors                                                                                                               [] 1 []2  []3+                                                                                                              Review of results from each unique test                                                                                                               [] 1 []2  [] 3+                                                                                                              Order of each unique test                                                                                                               [] 1 []2  [] 3+                                                                                                              Or                                                                                                             [] Assessment requiring an independent historian      4            [x] One or more chronic illness with exacerbation,              [x] Moderate                                                                      [x] Moderate                 Progression, or side effects of treatment                            -test documents or independent historians                        Prescription drug management                [x]  2 or more sable chronic illnesses                                    [] Independent interpretation of tests                              Minor surgery with identifiable risk                [] 1 undiagnosed new problem with uncertain prognosis    [] Discussion or management of test results                    elective major surgery                [] 1 acute illness with                systemic symptoms                                                                                                                                                              [] 1 acute  complicated injury                                                                                                                                          Elective major surgery                                                                                                                                                                                                                                                                                                                                                                                                  5            [] 1 or more chronic illnesses with severe exacerbation,     [] Extensive(two from below)                                         [] High                                                                                                               [] Independent interpretation of results                         Drug therapy requiring intensive                                                                                                               []Discussion of management or test interpretation           monitoring                                                                                                                                                                                                       Decision to de-escalate care                 [] 1 acute or chronic illness or injury that poses a threat                                                                                               Decision regarding hospitalization                                                                                                                                                                                                                       pt with three positives on stop bang questionnaire  OR booking notified via fax

## 2022-08-29 NOTE — ED PROVIDER NOTE - CHPI ED SYMPTOMS NEG
no bladder dysfunction/no bowel dysfunction/no tingling/no difficulty bearing weight/no numbness General: generalized body aches  Respiratory: Nonlabored  Cardiovascular: RRR  Abdominal: Soft, nondistended. No rebound or guarding. No organomegaly, no palpable mass. Tender in epigastric and RUQ areas.

## 2022-08-31 ENCOUNTER — HOSPITAL ENCOUNTER (INPATIENT)
Age: 72
LOS: 2 days | Discharge: HOME | DRG: 065 | End: 2022-09-02
Attending: EMERGENCY MEDICINE | Admitting: PSYCHIATRY & NEUROLOGY
Payer: MEDICARE

## 2022-08-31 ENCOUNTER — APPOINTMENT (OUTPATIENT)
Dept: CT IMAGING | Age: 72
DRG: 065 | End: 2022-08-31
Payer: MEDICARE

## 2022-08-31 DIAGNOSIS — I63.9 CEREBROVASCULAR ACCIDENT (CVA), UNSPECIFIED MECHANISM (HCC): Primary | ICD-10-CM

## 2022-08-31 PROBLEM — R29.704 NIHSS SCORE 4: Status: ACTIVE | Noted: 2022-08-31

## 2022-08-31 PROBLEM — G81.94 ACUTE LEFT HEMIPARESIS (HCC): Status: ACTIVE | Noted: 2022-08-31

## 2022-08-31 PROBLEM — R29.90 STROKE-LIKE SYMPTOMS: Status: ACTIVE | Noted: 2022-08-31

## 2022-08-31 PROBLEM — Z92.82 RECEIVED TISSUE PLASMINOGEN ACTIVATOR (T-PA) LESS THAN 24 HOURS PRIOR TO ARRIVAL: Status: ACTIVE | Noted: 2022-08-31

## 2022-08-31 LAB
ABSOLUTE EOS #: <0.03 K/UL (ref 0–0.44)
ABSOLUTE IMMATURE GRANULOCYTE: 0.05 K/UL (ref 0–0.3)
ABSOLUTE LYMPH #: 1.69 K/UL (ref 1.1–3.7)
ABSOLUTE MONO #: 0.64 K/UL (ref 0.1–1.2)
ANION GAP SERPL CALCULATED.3IONS-SCNC: 11 MMOL/L (ref 9–17)
BASOPHILS # BLD: 0 % (ref 0–2)
BASOPHILS ABSOLUTE: 0.03 K/UL (ref 0–0.2)
BILIRUBIN URINE: NEGATIVE
BUN BLDV-MCNC: 20 MG/DL (ref 8–23)
CALCIUM SERPL-MCNC: 8.7 MG/DL (ref 8.6–10.4)
CHLORIDE BLD-SCNC: 103 MMOL/L (ref 98–107)
CHOLESTEROL/HDL RATIO: 2.4
CHOLESTEROL: 87 MG/DL
CO2: 20 MMOL/L (ref 20–31)
COLOR: YELLOW
CREAT SERPL-MCNC: 0.75 MG/DL (ref 0.7–1.2)
EOSINOPHILS RELATIVE PERCENT: 0 % (ref 1–4)
EPITHELIAL CELLS UA: NORMAL /HPF (ref 0–5)
ESTIMATED AVERAGE GLUCOSE: 117 MG/DL
GFR AFRICAN AMERICAN: >60 ML/MIN
GFR NON-AFRICAN AMERICAN: >60 ML/MIN
GFR SERPL CREATININE-BSD FRML MDRD: ABNORMAL ML/MIN/{1.73_M2}
GLUCOSE BLD-MCNC: 101 MG/DL (ref 70–99)
GLUCOSE BLD-MCNC: 109 MG/DL (ref 75–110)
GLUCOSE URINE: NEGATIVE
HBA1C MFR BLD: 5.7 % (ref 4–6)
HCT VFR BLD CALC: 47.8 % (ref 40.7–50.3)
HDLC SERPL-MCNC: 36 MG/DL
HEMOGLOBIN: 15.6 G/DL (ref 13–17)
IMMATURE GRANULOCYTES: 0 %
INR BLD: 1.1
KETONES, URINE: NEGATIVE
LDL CHOLESTEROL: 38 MG/DL (ref 0–130)
LEUKOCYTE ESTERASE, URINE: NEGATIVE
LYMPHOCYTES # BLD: 14 % (ref 24–43)
MCH RBC QN AUTO: 26.5 PG (ref 25.2–33.5)
MCHC RBC AUTO-ENTMCNC: 32.6 G/DL (ref 28.4–34.8)
MCV RBC AUTO: 81.2 FL (ref 82.6–102.9)
MONOCYTES # BLD: 5 % (ref 3–12)
MYOGLOBIN: 31 NG/ML (ref 28–72)
NITRITE, URINE: NEGATIVE
NRBC AUTOMATED: 0 PER 100 WBC
PARTIAL THROMBOPLASTIN TIME: 26.4 SEC (ref 20.5–30.5)
PDW BLD-RTO: 16.4 % (ref 11.8–14.4)
PH UA: 5.5 (ref 5–8)
PLATELET # BLD: 186 K/UL (ref 138–453)
PMV BLD AUTO: 9.7 FL (ref 8.1–13.5)
POTASSIUM SERPL-SCNC: 3.9 MMOL/L (ref 3.7–5.3)
PROTEIN UA: NEGATIVE
PROTHROMBIN TIME: 11.6 SEC (ref 9.1–12.3)
RBC # BLD: 5.89 M/UL (ref 4.21–5.77)
RBC # BLD: ABNORMAL 10*6/UL
RBC UA: NORMAL /HPF (ref 0–4)
SEG NEUTROPHILS: 81 % (ref 36–65)
SEGMENTED NEUTROPHILS ABSOLUTE COUNT: 9.37 K/UL (ref 1.5–8.1)
SODIUM BLD-SCNC: 134 MMOL/L (ref 135–144)
SPECIFIC GRAVITY UA: 1.05 (ref 1–1.03)
TOTAL CK: 66 U/L (ref 39–308)
TRIGL SERPL-MCNC: 64 MG/DL
TROPONIN, HIGH SENSITIVITY: 15 NG/L (ref 0–22)
TURBIDITY: CLEAR
URINE HGB: ABNORMAL
UROBILINOGEN, URINE: NORMAL
WBC # BLD: 11.8 K/UL (ref 3.5–11.3)
WBC UA: NORMAL /HPF (ref 0–5)

## 2022-08-31 PROCEDURE — 85025 COMPLETE CBC W/AUTO DIFF WBC: CPT

## 2022-08-31 PROCEDURE — 83036 HEMOGLOBIN GLYCOSYLATED A1C: CPT

## 2022-08-31 PROCEDURE — 70450 CT HEAD/BRAIN W/O DYE: CPT

## 2022-08-31 PROCEDURE — 85610 PROTHROMBIN TIME: CPT

## 2022-08-31 PROCEDURE — 82947 ASSAY GLUCOSE BLOOD QUANT: CPT

## 2022-08-31 PROCEDURE — 82550 ASSAY OF CK (CPK): CPT

## 2022-08-31 PROCEDURE — 83874 ASSAY OF MYOGLOBIN: CPT

## 2022-08-31 PROCEDURE — 80048 BASIC METABOLIC PNL TOTAL CA: CPT

## 2022-08-31 PROCEDURE — 2000000000 HC ICU R&B

## 2022-08-31 PROCEDURE — 93005 ELECTROCARDIOGRAM TRACING: CPT

## 2022-08-31 PROCEDURE — 80061 LIPID PANEL: CPT

## 2022-08-31 PROCEDURE — 84484 ASSAY OF TROPONIN QUANT: CPT

## 2022-08-31 PROCEDURE — 85730 THROMBOPLASTIN TIME PARTIAL: CPT

## 2022-08-31 PROCEDURE — 99223 1ST HOSP IP/OBS HIGH 75: CPT | Performed by: PSYCHIATRY & NEUROLOGY

## 2022-08-31 PROCEDURE — 6360000002 HC RX W HCPCS: Performed by: STUDENT IN AN ORGANIZED HEALTH CARE EDUCATION/TRAINING PROGRAM

## 2022-08-31 PROCEDURE — 82553 CREATINE MB FRACTION: CPT

## 2022-08-31 PROCEDURE — 6360000004 HC RX CONTRAST MEDICATION: Performed by: STUDENT IN AN ORGANIZED HEALTH CARE EDUCATION/TRAINING PROGRAM

## 2022-08-31 PROCEDURE — 81001 URINALYSIS AUTO W/SCOPE: CPT

## 2022-08-31 PROCEDURE — 94761 N-INVAS EAR/PLS OXIMETRY MLT: CPT

## 2022-08-31 PROCEDURE — 70496 CT ANGIOGRAPHY HEAD: CPT

## 2022-08-31 PROCEDURE — 2580000003 HC RX 258: Performed by: NURSE PRACTITIONER

## 2022-08-31 PROCEDURE — 6370000000 HC RX 637 (ALT 250 FOR IP): Performed by: NURSE PRACTITIONER

## 2022-08-31 PROCEDURE — 99285 EMERGENCY DEPT VISIT HI MDM: CPT

## 2022-08-31 RX ORDER — OMEPRAZOLE 40 MG/1
40 CAPSULE, DELAYED RELEASE ORAL DAILY
COMMUNITY
Start: 2022-06-20 | End: 2022-09-29

## 2022-08-31 RX ORDER — SODIUM CHLORIDE 9 MG/ML
INJECTION, SOLUTION INTRAVENOUS CONTINUOUS
Status: ACTIVE | OUTPATIENT
Start: 2022-08-31 | End: 2022-09-01

## 2022-08-31 RX ORDER — ROSUVASTATIN CALCIUM 10 MG/1
10 TABLET, COATED ORAL DAILY
COMMUNITY
Start: 2022-06-18 | End: 2022-09-16 | Stop reason: DRUGHIGH

## 2022-08-31 RX ORDER — ONDANSETRON 2 MG/ML
4 INJECTION INTRAMUSCULAR; INTRAVENOUS ONCE
Status: DISCONTINUED | OUTPATIENT
Start: 2022-08-31 | End: 2022-09-01

## 2022-08-31 RX ORDER — ONDANSETRON 4 MG/1
4 TABLET, ORALLY DISINTEGRATING ORAL EVERY 8 HOURS PRN
Status: DISCONTINUED | OUTPATIENT
Start: 2022-08-31 | End: 2022-09-02 | Stop reason: HOSPADM

## 2022-08-31 RX ORDER — PANTOPRAZOLE SODIUM 40 MG/1
40 TABLET, DELAYED RELEASE ORAL
Status: DISCONTINUED | OUTPATIENT
Start: 2022-09-01 | End: 2022-09-02 | Stop reason: HOSPADM

## 2022-08-31 RX ORDER — POLYETHYLENE GLYCOL 3350 17 G/17G
17 POWDER, FOR SOLUTION ORAL DAILY PRN
Status: DISCONTINUED | OUTPATIENT
Start: 2022-08-31 | End: 2022-09-02 | Stop reason: HOSPADM

## 2022-08-31 RX ORDER — FUROSEMIDE 20 MG/1
20 TABLET ORAL EVERY OTHER DAY
COMMUNITY
Start: 2022-07-14

## 2022-08-31 RX ORDER — ASPIRIN 81 MG/1
81 TABLET ORAL DAILY
COMMUNITY
Start: 2022-03-23 | End: 2022-09-29

## 2022-08-31 RX ORDER — ONDANSETRON 2 MG/ML
4 INJECTION INTRAMUSCULAR; INTRAVENOUS EVERY 6 HOURS PRN
Status: DISCONTINUED | OUTPATIENT
Start: 2022-08-31 | End: 2022-09-02 | Stop reason: HOSPADM

## 2022-08-31 RX ORDER — CYCLOBENZAPRINE HCL 10 MG
10 TABLET ORAL DAILY PRN
COMMUNITY
Start: 2022-07-06

## 2022-08-31 RX ORDER — LABETALOL HYDROCHLORIDE 5 MG/ML
10 INJECTION, SOLUTION INTRAVENOUS
Status: DISCONTINUED | OUTPATIENT
Start: 2022-08-31 | End: 2022-09-02 | Stop reason: HOSPADM

## 2022-08-31 RX ORDER — SODIUM CHLORIDE 9 MG/ML
INJECTION, SOLUTION INTRAVENOUS CONTINUOUS
Status: DISCONTINUED | OUTPATIENT
Start: 2022-08-31 | End: 2022-08-31

## 2022-08-31 RX ORDER — ACETAMINOPHEN 325 MG/1
650 TABLET ORAL EVERY 4 HOURS PRN
Status: DISCONTINUED | OUTPATIENT
Start: 2022-08-31 | End: 2022-09-02 | Stop reason: HOSPADM

## 2022-08-31 RX ORDER — LOSARTAN POTASSIUM 50 MG/1
50 TABLET ORAL DAILY
COMMUNITY
Start: 2022-05-30

## 2022-08-31 RX ORDER — MELATONIN
1000 DAILY
COMMUNITY
Start: 2022-07-06

## 2022-08-31 RX ORDER — ATORVASTATIN CALCIUM 80 MG/1
80 TABLET, FILM COATED ORAL NIGHTLY
Status: DISCONTINUED | OUTPATIENT
Start: 2022-08-31 | End: 2022-09-02

## 2022-08-31 RX ORDER — LEVETIRACETAM 10 MG/ML
1000 INJECTION INTRAVASCULAR ONCE
Status: COMPLETED | OUTPATIENT
Start: 2022-08-31 | End: 2022-08-31

## 2022-08-31 RX ADMIN — SODIUM CHLORIDE: 9 INJECTION, SOLUTION INTRAVENOUS at 17:22

## 2022-08-31 RX ADMIN — IOPAMIDOL 90 ML: 755 INJECTION, SOLUTION INTRAVENOUS at 16:25

## 2022-08-31 RX ADMIN — LEVETIRACETAM 1000 MG: 10 INJECTION INTRAVENOUS at 17:26

## 2022-08-31 RX ADMIN — ATORVASTATIN CALCIUM 80 MG: 80 TABLET, FILM COATED ORAL at 20:11

## 2022-08-31 ASSESSMENT — ENCOUNTER SYMPTOMS
NAUSEA: 1
VOMITING: 0

## 2022-08-31 ASSESSMENT — PAIN - FUNCTIONAL ASSESSMENT: PAIN_FUNCTIONAL_ASSESSMENT: NONE - DENIES PAIN

## 2022-08-31 NOTE — ED PROVIDER NOTES
Primary Care Physician:  No primary care provider on file. Screenings:  [unfilled]    CHIEF COMPLAINT       Chief Complaint   Patient presents with    Cerebrovascular Accident       RECENT VITALS:    ,   ,  ,      LABS:  Labs Reviewed - No data to display    Radiology  No orders to display       CRITICAL CARE: There was a high probability of clinically significant/life threatening deterioration in this patient's condition which required my urgent intervention. Total critical care time was 5 minutes. This excludes any time for separately reportable procedures. EKG:  EKG Interpretation    Interpreted by me    Rhythm: normal sinus   Rate: normal  Axis: normal  Ectopy: none  Conduction: Left bundle branch block pattern  ST Segments: Discordant elevation anteriorly and discordant depression high lateral  T Waves: Inversion high lateral  Q Waves: none    Clinical Impression: Left bundle branch block pattern, no acute ischemic changes    Attending Physician Additional  Notes    Patient is transferred from [facility] emergency department for stroke receiving tPA. Last known well was 10 AM.  He has a history of cerebral aneurysm that was clipped sometime ago. No headache photophobia or nausea. He has left-sided weakness. He received tPA prior to transfer and finished just prior to arrival.  He was given Zofran for nausea and Cardene for hypertension. On exam he is comfortable appearing nontoxic afebrile vital signs are now normal with exception of pulse oximetry 94% normal.  GCS is 15. Normal pupils and extract movements. There is very subtle left nasolabial fold flattening. There is drift of the left upper and lower extremity. Normal pulses. Card exam is without murmur gallop or rub. Lungs are clear. Abdomen is benign. Neck is supple. Impression is stroke. Plan is stroke alert, CTA if needed, reexam, admission to ICU. Karri Edward.  Mary Jo Mason MD, [facility]  Attending Emergency Physician           Daniel Diaz MD  08/31/22 [id number]

## 2022-08-31 NOTE — H&P
Neuro ICU History & Physical    Patient Name: Gavino García  Patient : 1950  Room/Bed: 15/15  Code Status: FULL  Allergies: No Known Allergies    CHIEF COMPLAINT     Stroke like symptoms    HPI    History Obtained From:     The patient is a 74 y.o. male with a history of HTN, HLD, CAD and R MCA aneurysm clipping () who presented as a transfer from [facility] s/p TPA administration for acute stroke like symptoms. Initially presented to [facility] with acute onset left sided weakness and numbness/tingling. LKW around 1000AM.  Patient states he woke up this morning feeling like his normal self. Around 1000AM when he was eating breakfast his left arm and leg start to feel numb and tingly. He called EMS and was transported to [facility].  NIH at [facility] ED 4. 's. CT Head negative. TPA administered, infusion finished at 1526. Transferred to [facility] [place] for post TPA management. Stroke alert called on arrival. NIH 4.  CT Head showed age indeterminate right thalamic infarct. CTA Head/Neck showed right vert occlusion, right MCA aneurysm clipping. Patient is on Aspirin daily. Loaded with Keppra 1g. Of note, patient was seen last weekend at an [facility] ED after having a mechanical fall at home. Patient lives at home alone. He is independent in ADLs. He states he started using a cane recently due to fear of falling. His life partner passed away several years ago. He does not have children. His parents are . He has siblings and neices and nephews in the area. Neuro Critical Care consulted for admission. On exam in the ED, NIH 4. Patient Aox3 and following commands. No obvious facial droop. Mild dysarthria, possibly secondary to patient not having his dentures. Mild left upper and lower extremity drift, limbs do not hit the bed. Decreased sensation in the left upper and lower extremities. Denies chest pain, shortness of breath, dizziness, headache, pain. -170's. Admitted to the Neuro ICU for close monitoring. Admitted to ICU From: ED   Reason for ICU Admission: post TPA       PATIENT HISTORY   Past Medical History:    History reviewed. No pertinent past medical history. Past Surgical History:    History reviewed. No pertinent surgical history. Social History:   Social History     Socioeconomic History    Marital status: Unknown     Spouse name: Not on file    Number of children: Not on file    Years of education: Not on file    Highest education level: Not on file   Occupational History    Not on file   Tobacco Use    Smoking status: Not on file    Smokeless tobacco: Not on file   Substance and Sexual Activity    Alcohol use: Not on file    Drug use: Not on file    Sexual activity: Not on file   Other Topics Concern    Not on file   Social History Narrative    Not on file     Social Determinants of Health     Financial Resource Strain: Not on file   Food Insecurity: Not on file   Transportation Needs: Not on file   Physical Activity: Not on file   Stress: Not on file   Social Connections: Not on file   Intimate Partner Violence: Not on file   Housing Stability: Not on file       Family History:   History reviewed. No pertinent family history. Allergies:    Patient has no known allergies. Medications Prior to Admission:    Not in a hospital admission.     Current Medications:  Current Facility-Administered Medications: 0.9 % sodium chloride infusion, , IntraVENous, Continuous  ondansetron (ZOFRAN) injection 4 mg, 4 mg, IntraVENous, Once    REVIEW OF SYSTEMS     CONSTITUTIONAL: negative for fatigue and malaise   EYES: negative for double vision and photophobia    HEENT: negative for tinnitus and sore throat   RESPIRATORY: negative for cough, shortness of breath   CARDIOVASCULAR: negative for chest pain, palpitations, or syncope   GASTROINTESTINAL: negative for abdominal pain, nausea, vomiting, diarrhea, or constipation    GENITOURINARY: negative for incontinence or retention    MUSCULOSKELETAL: negative for neck or back pain, negative for extremity pain   NEUROLOGICAL: Positive for numbness, weakness. Negative for seizures, headaches, confusion, aphasia, dysarthria    PSYCHIATRIC: negative for agitation, hallucination, SI/HI   SKIN Negative for spontaneous contusions, rashes, or lesions      PHYSICAL EXAM:     BP (!) 159/70   Pulse 62   Temp 98.8 °F (37.1 °C) (Oral)   Resp 13   Ht 6' (1.829 m)   Wt 156 lb (70.8 kg)   SpO2 94%   BMI 21.16 kg/m²     PHYSICAL EXAM:  CONSTITUTIONAL:  Well developed, well nourished. Alert and oriented x 3, in no acute distress. GCS 15. Nontoxic. Slight dysarthria. No aphasia. HEAD:  normocephalic, atraumatic    EYES:  PERRL, EOMI   ENT:  moist mucous membranes   LUNGS:  Equal air entry bilaterally, clear   CARDIOVASCULAR:  normal s1 / s2, RRR, distal pulses palpable   ABDOMEN:  Soft, no rigidity   NECK supple, symmetric   EXTREMITIES Normal ROM with no deformities   NEUROLOGIC:  Mental Status:  A & O x3,awake             Cranial Nerves:    II: Visual fields:  normal  III: Pupils:  equal, round, reactive to light  III,IV,VI: Extra Ocular Movements: intact  V: Facial sensation:  intact  VII: Facial strength: intact    Motor Exam:    Drift:  present - left upper and lower extremities   Tone:  normal    5 out of 5 strength in the right upper and right lower extremities  4/5 strength in the left upper extremity, able to antigravity with drift. 3+/5 strength in the left lower extremity, able to antigravity but not able to lift leg as high as the right, does not drift to bed.     Sensory:    Touch:    Right Upper Extremity:  normal  Left Upper Extremity:  abnormal - numbness/tingling  Right Lower Extremity:  normal  Left Lower Extremity:  abnormal- numbness/tingling    Coordination/Dysmetria:  Finger to Nose:   Right:  normal  Left:  normal      SKIN No obvious ecchymosis, rashes, or lesions    NIH Stroke Scale Total (if not done complete detailed one below):    1a.  Level of consciousness:  0 - alert; keenly responsive  1b. Level of consciousness questions:  0 - answers both questions correctly  1c. Level of consciousness questions:  0 - performs both tasks correctly  2. Best Gaze:  0 - normal  3. Visual:  0 - no visual loss  4. Facial Palsy:  0 - normal symmetric movement  5a. Motor left arm:  1 - drift, limb holds 90 (or 45) degrees but drifts down before full 10 seconds: does not hit bed  5b. Motor right arm:  0 - no drift, limb holds 90 (or 45) degrees for full 10 seconds  6a. Motor left le - drift; leg falls by the end of the 5 second period but does not hit bed  6b. Motor right le - no drift; leg holds 30 degree position for full 5 seconds  7. Limb Ataxia:  0 - absent  8. Sensory:  1 - mild to moderate sensory loss; patient feels pinprick is less sharp or is dull on the affected side; there is a loss of superficial pain with pinprick but patient is aware of being touched   9. Best Language:  0 - no aphasia, normal  10. Dysarthria:  1 - mild to moderate, patient slurs at least some words and at worst, can be understood with some difficulty  11.   Extinction and Inattention:  0 - no abnormality    LABS AND IMAGING:     RECENT LABS:  CBC with Differential:    Lab Results   Component Value Date/Time    WBC 11.8 2022 04:04 PM    RBC 5.89 2022 04:04 PM    HGB 15.6 2022 04:04 PM    HCT 47.8 2022 04:04 PM     2022 04:04 PM    MCV 81.2 2022 04:04 PM    MCH 26.5 2022 04:04 PM    MCHC 32.6 2022 04:04 PM    RDW 16.4 2022 04:04 PM    LYMPHOPCT 14 2022 04:04 PM    MONOPCT 5 2022 04:04 PM    BASOPCT 0 2022 04:04 PM    MONOSABS 0.64 2022 04:04 PM    LYMPHSABS 1.69 2022 04:04 PM    EOSABS <0.03 2022 04:04 PM    BASOSABS 0.03 2022 04:04 PM     BMP:    Lab Results   Component Value Date/Time    NA PENDING 2022 04:04 PM    K PENDING 08/31/2022 04:04 PM    CL PENDING 08/31/2022 04:04 PM    CO2 PENDING 08/31/2022 04:04 PM    BUN PENDING 08/31/2022 04:04 PM    LABALBU 4.1 02/09/2022 05:22 AM    CREATININE PENDING 08/31/2022 04:04 PM    CALCIUM PENDING 08/31/2022 04:04 PM    GFRAA PENDING 08/31/2022 04:04 PM    LABGLOM PENDING 08/31/2022 04:04 PM    GLUCOSE PENDING 08/31/2022 04:04 PM       RADIOLOGY:   CT HEAD WO CONTRAST  Result Date: 8/31/2022  Age-indeterminate lacunar infarct within the right thalamus. Findings were discussed with Chante Thibodeaux MD at 4:41 pm on 8/31/2022. CTA HEAD NECK W CONTRAST    Addendum Date: 8/31/2022    ADDENDUM: Findings were discussed with Chante Thibodeaux MD at 5:15 pm on 8/31/2022. Result Date: 8/31/2022  Occluded right vertebral artery with reconstitution of flow distally; age-indeterminate. Status post right MCA aneurysm clipping, without evidence of aneurysmal remnant. RECOMMENDATIONS: Unavailable       Labs and Images reviewed with:    [] Armando Gottlieb MD    [] Lior Dacosta MD  [x] Lino Castillo MD  --[] there are no new interval images to review. ASSESSMENT AND PLAN:       The patient is a 73 yo male with a history of HTN, HLD, CAD and brain right MCA aneurysm clipping (2001) who presented as a transfer from [place] ED s/p TPA administration for acute stroke like symptoms. Initially presented to [facility] ED with acute onset left sided weakness and numbness/tingling. LKW around 1000AM.  CT Head with no acute abnormality. S/P TPA administration, finished at 1526. CTA Head/Neck showed showed right vert occlusion, right MCA aneurysm clipping.     NEUROLOGIC:  - Acute onset stroke like symptoms  - S/P TPA administration 8/31 around 1500  - CTA Head/Neck showed right vert occlusion, right MCA aneurysm clipping  - F/U MRI Brain without contrast (if able to get info on clip)  - CT Head 9/1 around 1500, 24h post tPA  - No antiplatelets/anticoagulants fo 24h post tPA  - Goal SBP<180  - Loaded with Keppra in ED, consider EEG  - Neuro checks per protocol    CARDIOVASCULAR:  - Goal SBP<180  - PRN Labetalol  - Essential HTN; hold home Losartan and Lasix for now, consider starting 9/1  - Troponin 15  - F/U Echocardiogram  - Hyperlipidemia; Lipitor 80mg QHS in setting of acute stroke, follow lipid panel  - Continue telemetry    PULMONARY:  - Maintaining O2 sats on room air  - Quit smoking 20 years ago    RENAL/FLUID/ELECTROLYTE:  - Normal renal function  - BUN 20/ Creatinine 0.75  - Monitor I&O  - IVF: Timo@4vets  - Replace electrolytes PRN  - Daily BMP    GI/NUTRITION:  NUTRITION:  No diet orders on file  - Passed nursing bedside swallow, OK for Regular diet  - Bowel regimen: Glycolax   - GI prophylaxis: PPI (home Prilosec)    ID:  - Afebrile  - Mild  leukocytosis, 11.8  - Check UA, CXR  - Monitor off antibiotics for now  - Daily CBC    HEME:   - H&H 15.6/47.8  - Platelets 142  - Daily CBC    ENDOCRINE:  - Continue to monitor blood glucose, goal <180  - F/U Hemoglobin A1C    OTHER:  - PT/OT/ST   - PM&R consult    PROPHYLAXIS:  Stress ulcer: PPI    DVT PROPHYLAXIS:  - SCD sleeves - Thigh High   - No chemoprophylaxis anticoagulation at this time post tPA. DISPOSITION: Admit to the Neuro ICU for post tPA management.       Caryn Parr, APRN - [address] [facility]  Neuro Critical Care Service   Pager [phone]  8/31/2022     4:30 PM

## 2022-08-31 NOTE — CONSULTS
Department of Endovascular Neurosurgery                                                                                                                      Resident Consult Note  Stroke Alert paged @ 3:19PM  ER Room # 15  Arrival to patient bedside @ on arrival 3:40PM        Reason for Consult:  left sided weakness and numbness  Requesting Physician:  Dr. Nikki Bolden   Endovascular Neurosurgeon/stroke attending   [x]Dr. Jessi Drummond  []Dr. Demetrio Tolentino  []Dr. Kelli Wilcox  []Dr. Carey Riddle    History Obtained From:  patient, electronic medical record    CHIEF COMPLAINT:     Acute left sided weakness and numbness Leg>arm    HISTORY OF PRESENT ILLNESS:       The patient is a 74 y.o. male who presents as a transfer from [address] [place] [address] Emergency department 8/31/22 after acute onset left sided weakness and numbness. PMH significant for ruptured right cerebral Aneurysm s/p Clipping 11/2001, HTN, BPH, malignant prostate neoplasm, chronic low back pain with Degenerative disc disease. Patient states that he was eating breakfast this morning when he noticed left arm and leg weakness. Patient was unable to get up and walk. Inititally went to [facility] ED where CT head WO was no acute IC abnormalities and given NIH of 4 decision was made for IV TNK and transferred to our facility for NICU. On arrival BP stable <180, NIH similar noted to have left arm drift 1, left leg hits bed 2, left sided hemisensory loss 1 total of 4. CTA head and neck demonstrating right MCA territory aneurysm clip with surrounding encephalomalacia consistent with his previous aneurysm ruptured in 2001. Will load patient with Keppra 1 g and admit to neuro ICU for close neurologic monitoring, EEG. On presentation:  BP:159/70  BSL:109    Prior to arrival patient was on  Antiplatelets/anticoagulants:asa 81mg   Statins: crestor 10mg       Smoking history: former smoker, quit . 5PPD 20 years quit around 2000  PAST MEDICAL HISTORY :       Past Medical History:    History reviewed. No pertinent past medical history. Past Surgical History:    History reviewed. No pertinent surgical history. Social History:   Social History     Socioeconomic History    Marital status: Unknown     Spouse name: Not on file    Number of children: Not on file    Years of education: Not on file    Highest education level: Not on file   Occupational History    Not on file   Tobacco Use    Smoking status: Not on file    Smokeless tobacco: Not on file   Substance and Sexual Activity    Alcohol use: Not on file    Drug use: Not on file    Sexual activity: Not on file   Other Topics Concern    Not on file   Social History Narrative    Not on file     Social Determinants of Health     Financial Resource Strain: Not on file   Food Insecurity: Not on file   Transportation Needs: Not on file   Physical Activity: Not on file   Stress: Not on file   Social Connections: Not on file   Intimate Partner Violence: Not on file   Housing Stability: Not on file       Family History:   History reviewed. No pertinent family history. Allergies:  Patient has no known allergies.     Home Medications:  Prior to Admission medications    Not on File       Current Medications:   Current Facility-Administered Medications: 0.9 % sodium chloride infusion, , IntraVENous, Continuous  ondansetron (ZOFRAN) injection 4 mg, 4 mg, IntraVENous, Once    REVIEW OF SYSTEMS:       CONSTITUTIONAL: negative for fatigue and malaise   EYES: negative for double vision and photophobia    HEENT: negative for tinnitus and sore throat   RESPIRATORY: negative for cough, shortness of breath   CARDIOVASCULAR: negative for chest pain, palpitations   GASTROINTESTINAL: negative for nausea, vomiting   GENITOURINARY: negative for incontinence   MUSCULOSKELETAL: negative for neck or back pain   NEUROLOGICAL: Positive for slow to respond and AMS    PSYCHIATRIC: negative for fatigue Review of systems otherwise negative. PHYSICAL EXAM:       BP (!) 159/70   Pulse 62   Temp 98.8 °F (37.1 °C) (Oral)   Resp 13   Ht 6' (1.829 m)   Wt 156 lb (70.8 kg)   SpO2 94%   BMI 21.16 kg/m²     CONSTITUTIONAL:  Well developed, well nourished, alert and oriented x 3, in no acute distress. GCS 15, nontoxic. No dysarthria, no aphasia. HEAD:  normocephalic, atraumatic    EYES:  PERRLA, EOMI.   ENT:  moist mucous membranes   NECK:  supple, symmetric, no midline tenderness to palpation    BACK:  without midline tenderness, step-offs or deformities    LUNGS:  Equal air entry bilaterally   CARDIOVASCULAR:  normal s1 / s2   ABDOMEN:  Soft, no rigidity   NEUROLOGIC:  Mental Status:  A & O x3,awake             Cranial Nerves:    cranial nerves II-XII are grossly intact    Motor Exam:    Drift:  absent  Tone:  normal    LUE 4-/5  LLE 3/5  RUE 5/5  RLE 5/5     Sensory:    Touch:    Right Upper Extremity:  normal  Left Upper Extremity:  abnormal - decreased  Right Lower Extremity:  normal  Left Lower Extremity:  abnormal - decreased    Deep Tendon Reflexes:    Right Bicep:  1+  Left Bicep:  1+  Right Knee:  1+  Left Knee:  1+    Plantar Response:  Right:  downgoing  Left:  downgoing    Clonus:  absent  Fish's:  absent    Coordination/Dysmetria:  Heel to Shin:  Right:  normal  Finger to Nose:   Right:  normal         INITIAL NIH STROKE SCALE:    Time Performed:  3:50 PM     1a. Level of consciousness:  0 - alert; keenly responsive  1b. Level of consciousness questions:  0 - answers both questions correctly  1c. Level of consciousness questions:  0 - performs both tasks correctly  2. Best Gaze:  0 - normal  3. Visual:  0 - no visual loss  4. Facial Palsy:  0 - normal symmetric movement  5a. Motor left arm:  1 - drift, limb holds 90 (or 45) degrees but drifts down before full 10 seconds: does not hit bed  5b. Motor right arm:  0 - no drift, limb holds 90 (or 45) degrees for full 10 seconds  6a. Motor left le - some effort against gravity; leg falls to bed by 5 seconds but has some effort against gravity  6b. Motor right le - no drift; leg holds 30 degree position for full 5 seconds  7. Limb Ataxia:  0 - absent  8. Sensory:  1 - mild to moderate sensory loss; patient feels pinprick is less sharp or is dull on the affected side; there is a loss of superficial pain with pinprick but patient is aware of being touched   9. Best Language:  0 - no aphasia, normal  10. Dysarthria:  0 - normal  11.   Extinction and Inattention:  0 - no abnormality    TOTAL:  4     SKIN:  no rash      Modified [place] Score Scale:     [] Zero: No symptoms at all   [x] 1: No significant disability despite symptoms; able to carry out all usual duties and activities   [] 2: Slight disability; unable to carry out all previous activities, but able to look after own affairs without assistance   [] 3:Moderate disability; requiring some help, but able to walk without assistance   [] 4: Moderately severe disability; unable to walk and attend to bodily needs without assistance   [] 5:Severe disability; bedridden, incontinent and requiring constant nursing care and attention    LABS AND IMAGING:     CBC with Differential:    Lab Results   Component Value Date/Time    WBC 10.5 2022 05:22 AM    RBC 5.25 2022 05:22 AM    HGB 14.0 2022 05:22 AM    HCT 43.8 2022 05:22 AM     2022 05:22 AM    MCV 83.4 2022 05:22 AM    MCH 26.7 2022 05:22 AM    MCHC 32.0 2022 05:22 AM    RDW 14.0 2022 05:22 AM         BMP:    Lab Results   Component Value Date/Time     2022 05:22 AM    K 3.7 2022 05:22 AM     2022 05:22 AM    CO2 22 2022 05:22 AM    BUN 14 2022 05:22 AM    LABALBU 4.1 2022 05:22 AM    CREATININE 0.85 2022 05:22 AM    CALCIUM 9.5 2022 05:22 AM    GFRAA >60 2022 05:22 AM    LABGLOM >60 2022 05:22 AM GLUCOSE 104 02/09/2022 05:22 AM       Radiology Review:    1.) CT brain without contrast: (Reviewed at 4:07PM)   -right parietal territory Aneursm clip with surrounding encephalomalacia. Impression   Age-indeterminate lacunar infarct within the right thalamus. 2.) CTA Head/Neck: (Reviewed at 4:10PM)          ASSESSMENT AND PLAN:     There is no problem list on file for this patient. Assessment                 74 y.o. male who presents with acute left sided weakness and numbness. NIH of 4 at 425  [facility] around 10:00AM and decision made to give IV TNK at 2:00PM. Patient transferred from [place] [address] for admit to NICU for post TNK monitoring. Also recommending loading Keppra 1 g due to history of right MCA aneurysm with surrounding encephalomalacia patient is waxing and waning mentation and strength concerning for subclinical seizures. We will get routine EEG once admitted to neuro ICU. Last Known Well (date and time): 10:00AM 8/31/22    2. Candidate for IV tPA therapy     Yes [x] Decision made at 1:55PM to give IV TNK bolus         3.  Candidate for Thrombectomy    Yes []      No [x] due to the following exclusion criteria: no LVO or critical stenosis    - Discussed with Dr. Anum Irizarry      Recommendations:      [x] NICU Status - (5B)         Please use the following admission order set for stroke admission:      [x] [id number] - [facility] Ischemic Stroke TNK Treatment Focused         Imaging   - CT Head  WO : done   - CTA Head and Neck : done   - MRI Brain WO :  - ECHO  - routine 30 minute EEG       Medications   -  hold home asa 81mg   - Keppra 1gram Once IV loading dose   - Given IV TNK at outlying facility   - Folic acid 1mg BID  - Crestor 40 mg nightly     Labs  - Fasting Lipid panel  - HgbA1c lab      - PT, OT, Speech eval   - Telemetry   - Neuro checks per protocol  - We recommend SBP <180 post TNK protocal  - Blood glucose goal less than 180  - Please avoid dextrose containing solutions        Additional recommendations may follow    Please contact [facility] with any changes in patients neurologic status. Thank you for your consult.        Damon Garcia MD   PGY 4 Neurology Resident  8/31/2022 at 4:02 PM

## 2022-08-31 NOTE — CODE DOCUMENTATION
STK paged at [phone]  Pt not on any blood thinners.   No known allergies  MRI not done in ED visit

## 2022-08-31 NOTE — ED PROVIDER NOTES
[facility]  ED  Emergency Department Encounter  Emergency Medicine Resident     Pt Name: Marcus Gaston  MRN: [id number]  Maggiegfwillard 1950  Date of evaluation: 8/31/22  PCP:  No primary care provider on file. CHIEF COMPLAINT       Chief Complaint   Patient presents with    Cerebrovascular Accident       HISTORY OFPRESENT ILLNESS  (Location/Symptom, Timing/Onset, Context/Setting, Quality, Duration, Modifying Factors,Severity.)      Marcus Gaston is a 74 y. o.yo male who history of right cerebral aneurysm status post coiling years ago who initially presented at [facility] for left sided flaccid paralysis. NIH at [facility] was noted to be 4. Last known well of 10 AM  CT at [facility] was negative for intracranial hemorrhage   Given symptoms and discussion with neurologist Dr. Reynolds Lefort, she was made to administer tPA     tPA was given around 3 PM  With flight, patient was started on Cardene for blood pressure control of systolic of less than 878  Arrival, patient awake, alert, having full conversation  An NIH of 4 on arrival.  PAST MEDICAL / SURGICAL / SOCIAL / FAMILY HISTORY      has no past medical history on file. has no past surgical history on file.      Social History     Socioeconomic History    Marital status: Unknown     Spouse name: Not on file    Number of children: Not on file    Years of education: Not on file    Highest education level: Not on file   Occupational History    Not on file   Tobacco Use    Smoking status: Not on file    Smokeless tobacco: Not on file   Substance and Sexual Activity    Alcohol use: Not on file    Drug use: Not on file    Sexual activity: Not on file   Other Topics Concern    Not on file   Social History Narrative    Not on file     Social Determinants of Health     Financial Resource Strain: Not on file   Food Insecurity: Not on file   Transportation Needs: Not on file   Physical Activity: Not on file   Stress: Not on file   Social 7 for level 4, 8 or more forlevel 5)      INITIAL VITALS:   ED Triage Vitals   BP Temp Temp Source Heart Rate Resp SpO2 Height Weight   08/31/22 1547 08/31/22 1547 08/31/22 1547 08/31/22 1547 08/31/22 1547 08/31/22 1547 08/31/22 1550 08/31/22 1548   (!) 154/71 98.8 °F (37.1 °C) Oral 62 13 94 % 6' (1.829 m) 156 lb (70.8 kg)       Physical Exam  Constitutional:       Appearance: Normal appearance. HENT:      Head: Normocephalic and atraumatic. Nose: Nose normal.      Mouth/Throat:      Mouth: Mucous membranes are moist.   Eyes:      Extraocular Movements: Extraocular movements intact. Pupils: Pupils are equal, round, and reactive to light. Cardiovascular:      Rate and Rhythm: Normal rate. Pulmonary:      Effort: Pulmonary effort is normal.   Abdominal:      General: There is no distension. Palpations: Abdomen is soft. Tenderness: There is no abdominal tenderness. Musculoskeletal:         General: No swelling or tenderness. Skin:     General: Skin is warm. Coloration: Skin is not jaundiced. Neurological:      Mental Status: He is alert. GCS: GCS eye subscore is 4. GCS verbal subscore is 5. GCS motor subscore is 6. Motor: Weakness present. Comments: With diminished sensation over left upper extremity, his left upper extremity but does not hit the bed, his left lower extremity hits the bed. Patient subsequently had an NIH of 4   Psychiatric:         Mood and Affect: Mood normal.         Behavior: Behavior normal.       DIFFERENTIAL  DIAGNOSIS     PLAN (LABS / IMAGING / EKG):  Orders Placed This Encounter   Procedures    CT HEAD WO CONTRAST    CTA HEAD NECK W CONTRAST    MRI BRAIN WO CONTRAST    XR CHEST PORTABLE    CT head without contrast    STROKE PANEL    CBC    Basic Metabolic Panel w/ Reflex to MG    Hemoglobin A1C    Lipid Panel    Urinalysis with Reflex to Culture    Microscopic Urinalysis    ADULT DIET;  Regular    Vital signs during and post thrombolytic agent Notify physician during and post thrombolytic agent    Notify physician during and post thrombolytic agent    Bedrest during and post thrombolytic agent    Elevate HOB during and post thrombolytic agent    NIH Stroke Scale (NIHSS) during and post thrombolytic agent    Implement thrombolytic agent hemorrhage/bleeding precautions    Avoid antiplatelet and antithrombotic medications for 24 hours post administration of thrombolytic agent    No Anticoagulants for 24 hours after thrombolytic agent    Implement suspected intracerebral hemorrhage (ICH) guidelines    Swallow screen by nursing before diet and oral medications started. Stroke education    Encourage deep breathing and coughing every two hours while awake    Place intermittent pneumatic compression device    Telemetry monitoring - continuous duration    Misc nursing order (specify)    Full Code    Inpatient consult to Neurocritical care    Pharmacy to Dose: Other - See Comments: The pharmacist will review this patient's medication profile to evaluate IV medications and change all base solutions to 0.9% sodium chloride if possible based on compatibility and product availability. This. .. Pharmacy to change base solutions.     Inpatient consult to PM&R - Physiatry    OT eval and treat    PT evaluation and treat    Initiate Oxygen Therapy Protocol    Incentive spirometry    Pulse oximetry, continuous    Speech Language Pathology (SLP) eval and treat    POC Glucose Fingerstick    EKG 12 Lead    Echo Complete    Initiate minimum 2 IV lines for thrombolytic agent infusion    ADMIT TO INPATIENT    Fall precautions       MEDICATIONS ORDERED:  Orders Placed This Encounter   Medications    DISCONTD: 0.9 % sodium chloride infusion    DISCONTD: ondansetron (ZOFRAN) injection 4 mg    iopamidol (ISOVUE-370) 76 % injection 90 mL    OR Linked Order Group     ondansetron (ZOFRAN-ODT) disintegrating tablet 4 mg     ondansetron (ZOFRAN) injection 4 mg    polyethylene glycol (GLYCOLAX) packet 17 g    perflutren lipid microspheres (DEFINITY) injection 1.65 mg    0.9 % sodium chloride infusion    atorvastatin (LIPITOR) tablet 80 mg    levETIRAcetam (KEPPRA) 1000 mg/100 mL IVPB    labetalol (NORMODYNE;TRANDATE) injection 10 mg    pantoprazole (PROTONIX) tablet 40 mg    acetaminophen (TYLENOL) tablet 650 mg       Initial MDM/Plan: 74 y.o. male who presents with sided weakness  Patient awake alert and oriented GCS of 15, patient awake alert and oriented. Impression is a CVA of ischemic form, status post tPA  Stroke team at bedside  Plan for repeat CT head and obtain CTA  Will obtain EKG, stroke panel  Plan for patient to neuro critical care  At this moment we will hold off on Cardene given the blood pressure is 159/70.       DIAGNOSTIC RESULTS / EMERGENCYDEPARTMENT COURSE / MDM     LABS:  Labs Reviewed   STROKE PANEL - Abnormal; Notable for the following components:       Result Value    Glucose 101 (*)     Sodium 134 (*)     WBC 11.8 (*)     RBC 5.89 (*)     MCV 81.2 (*)     RDW 16.4 (*)     Seg Neutrophils 81 (*)     Lymphocytes 14 (*)     Eosinophils % 0 (*)     Segs Absolute 9.37 (*)     All other components within normal limits   LIPID PANEL - Abnormal; Notable for the following components:    HDL 36 (*)     All other components within normal limits   CBC - Abnormal; Notable for the following components:    WBC 11.7 (*)     Hematocrit 40.0 (*)     MCV 81.8 (*)     RDW 16.5 (*)     All other components within normal limits   BASIC METABOLIC PANEL W/ REFLEX TO MG FOR LOW K - Abnormal; Notable for the following components:    Calcium 8.4 (*)     Anion Gap 7 (*)     All other components within normal limits   URINALYSIS WITH REFLEX TO CULTURE - Abnormal; Notable for the following components:    Specific Gravity, UA 1.053 (*)     Urine Hgb TRACE (*)     All other components within normal limits   HEMOGLOBIN A1C   MICROSCOPIC URINALYSIS   POC GLUCOSE FINGERSTICK         RADIOLOGY:  CT HEAD WO CONTRAST    Result Date: 8/31/2022  EXAMINATION: CT OF THE HEAD WITHOUT CONTRAST  8/31/2022 4:19 pm TECHNIQUE: CT of the head was performed without the administration of intravenous contrast. Automated exposure control, iterative reconstruction, and/or weight based adjustment of the mA/kV was utilized to reduce the radiation dose to as low as reasonably achievable. COMPARISON: None. HISTORY: ORDERING SYSTEM PROVIDED HISTORY: left sided weakness and numbness TECHNOLOGIST PROVIDED HISTORY: left sided weakness and numbness Decision Support Exception - unselect if not a suspected or confirmed emergency medical condition->Emergency Medical Condition (MA) Reason for Exam: left sided weakness and numbness FINDINGS: The patient is status post a right-sided craniotomy with aneurysm clipping of the right MCA. There is encephalomalacia of the adjacent frontotemporal lobe. Mild chronic white matter microvascular ischemic changes are noted. There is an age-indeterminate lacunar infarct within the right thalamus. No mass, shift, or bleed is identified. Age-indeterminate lacunar infarct within the right thalamus. Findings were discussed with Caro Valencia MD at 4:41 pm on 8/31/2022. XR CHEST PORTABLE    Result Date: 9/1/2022  EXAMINATION: ONE XRAY VIEW OF THE CHEST 9/1/2022 12:31 am COMPARISON: None. HISTORY: ORDERING SYSTEM PROVIDED HISTORY: leukocytosis TECHNOLOGIST PROVIDED HISTORY: leukocytosis Reason for Exam: upr,hx stroke FINDINGS: Lungs are clear. Cardiomediastinal silhouette is within normal limits. No pleural effusion. No pneumothorax. Bony structures are unremarkable. No acute findings. CTA HEAD NECK W CONTRAST    Addendum Date: 8/31/2022    ADDENDUM: Findings were discussed with Caro Valencia MD at 5:15 pm on 8/31/2022.      Result Date: 8/31/2022  EXAMINATION: CTA OF THE HEAD AND NECK WITH CONTRAST 8/31/2022 4:28 pm: TECHNIQUE: CTA of the head and neck was performed with the administration of intravenous contrast. Multiplanar reformatted images are provided for review. MIP images are provided for review. Stenosis of the internal carotid arteries measured using NASCET criteria. Automated exposure control, iterative reconstruction, and/or weight based adjustment of the mA/kV was utilized to reduce the radiation dose to as low as reasonably achievable. COMPARISON: None. HISTORY: ORDERING SYSTEM PROVIDED HISTORY: left sided weakness and numbness TECHNOLOGIST PROVIDED HISTORY: left sided weakness and numbness Decision Support Exception - unselect if not a suspected or confirmed emergency medical condition->Emergency Medical Condition (MA) Reason for Exam: left sided weakness and numbness FINDINGS: CTA NECK: AORTIC ARCH/ARCH VESSELS: No dissection or arterial injury. No significant stenosis of the brachiocephalic or subclavian arteries. CAROTID ARTERIES: No dissection, arterial injury, or hemodynamically significant stenosis by NASCET criteria. VERTEBRAL ARTERIES: The left vertebral artery is dominant and is patent. There appears to be a critical stenosis at the origin of the right vertebral artery with attenuated flow and eventual occlusion at the level of the C3 vertebral body. There is reconstitution of flow beginning at the V3/V4 junction, likely filling in retrograde fashion. SOFT TISSUES: The lung apices are clear. No cervical or superior mediastinal lymphadenopathy. The larynx and pharynx are unremarkable. No acute abnormality of the salivary and thyroid glands. BONES: No acute osseous abnormality. CTA HEAD: ANTERIOR CIRCULATION: No significant stenosis of the intracranial internal carotid, anterior cerebral, or middle cerebral arteries. Status post right MCA aneurysm clipping, without evidence of aneurysmal remnant. POSTERIOR CIRCULATION: No significant stenosis of the vertebral, basilar, or posterior cerebral arteries. No aneurysm. OTHER: No dural venous sinus thrombosis on this non-dedicated study.  BRAIN: See separately dictated noncontrast head CT report. Occluded right vertebral artery with reconstitution of flow distally; age-indeterminate. Status post right MCA aneurysm clipping, without evidence of aneurysmal remnant. RECOMMENDATIONS: Unavailable        EKG  EKG Interpretation    Interpreted by me    Rhythm: normal sinus   Rate: normal  Axis: normal  Ectopy: none  Conduction: normal  ST Segments: no acute change  T Waves: no acute change  Q Waves: none    Clinical Impression: Left bundle branch block, not new    All EKG's are interpreted by the Emergency Department Physicianwho either signs or Co-signs this chart in the absence of a cardiologist.    EMERGENCY DEPARTMENT COURSE:          PROCEDURES:  None    CONSULTS:  IP CONSULT TO NEUROCRITICAL CARE  IP CONSULT TO PHARMACY  PHARMACY TO CHANGE BASE FLUIDS  IP CONSULT TO PHYSICAL MEDICINE REHAB    CRITICAL CARE:      FINAL IMPRESSION      1. Cerebrovascular accident (CVA), unspecified mechanism ([facility] [address]          DISPOSITION / [address] Admitted 08/31/2022 04:29:54 PM      PATIENT REFERRED TO:  No follow-up provider specified.     DISCHARGE MEDICATIONS:  Current Discharge Medication List          Mina Van MD  Emergency Medicine Resident    (Please note that portions of this note were completed with a voice recognition program.Efforts were made to edit the dictations but occasionally words are mis-transcribed.)        Mina Van MD  Resident  09/01/22 [id number]

## 2022-08-31 NOTE — ED NOTES
Mariza Body  [birth year]  CVA  L sided weakness  Last known well 10:00 AM  CT non-contrast negative, did not do a CTA  Getting aspirin and plavix     Dia Mora RN  08/31/22 [id number]

## 2022-08-31 NOTE — ED NOTES
Report given to staff RN. All questions answered. Pt by monitor to room 117 with RN.      Damari Aden RN  08/31/22 [id number]

## 2022-08-31 NOTE — ED TRIAGE NOTES
Pt was brought to room 15 by life flight 2 with stroke symptoms. Pt is not currently on blood thinners. Pts last known well was at 1000 today. Pt received TPA. The TPA finished at 1526. NIH scale on arrival was 4. left side weakness. HX of aneurysm with clip. HTN.

## 2022-09-01 ENCOUNTER — APPOINTMENT (OUTPATIENT)
Dept: GENERAL RADIOLOGY | Age: 72
DRG: 065 | End: 2022-09-01
Payer: MEDICARE

## 2022-09-01 ENCOUNTER — APPOINTMENT (OUTPATIENT)
Dept: CT IMAGING | Age: 72
DRG: 065 | End: 2022-09-01
Payer: MEDICARE

## 2022-09-01 LAB
ANION GAP SERPL CALCULATED.3IONS-SCNC: 7 MMOL/L (ref 9–17)
BUN BLDV-MCNC: 17 MG/DL (ref 8–23)
CALCIUM SERPL-MCNC: 8.4 MG/DL (ref 8.6–10.4)
CHLORIDE BLD-SCNC: 107 MMOL/L (ref 98–107)
CO2: 21 MMOL/L (ref 20–31)
CREAT SERPL-MCNC: 0.73 MG/DL (ref 0.7–1.2)
GFR AFRICAN AMERICAN: >60 ML/MIN
GFR NON-AFRICAN AMERICAN: >60 ML/MIN
GFR SERPL CREATININE-BSD FRML MDRD: ABNORMAL ML/MIN/{1.73_M2}
GLUCOSE BLD-MCNC: 98 MG/DL (ref 70–99)
HCT VFR BLD CALC: 40 % (ref 40.7–50.3)
HEMOGLOBIN: 13.5 G/DL (ref 13–17)
MCH RBC QN AUTO: 27.6 PG (ref 25.2–33.5)
MCHC RBC AUTO-ENTMCNC: 33.8 G/DL (ref 28.4–34.8)
MCV RBC AUTO: 81.8 FL (ref 82.6–102.9)
NRBC AUTOMATED: 0 PER 100 WBC
PDW BLD-RTO: 16.5 % (ref 11.8–14.4)
PLATELET # BLD: 170 K/UL (ref 138–453)
PMV BLD AUTO: 9.4 FL (ref 8.1–13.5)
POTASSIUM SERPL-SCNC: 4 MMOL/L (ref 3.7–5.3)
RBC # BLD: 4.89 M/UL (ref 4.21–5.77)
SODIUM BLD-SCNC: 135 MMOL/L (ref 135–144)
WBC # BLD: 11.7 K/UL (ref 3.5–11.3)

## 2022-09-01 PROCEDURE — 94761 N-INVAS EAR/PLS OXIMETRY MLT: CPT

## 2022-09-01 PROCEDURE — 2060000000 HC ICU INTERMEDIATE R&B

## 2022-09-01 PROCEDURE — 6360000002 HC RX W HCPCS: Performed by: NURSE PRACTITIONER

## 2022-09-01 PROCEDURE — 6370000000 HC RX 637 (ALT 250 FOR IP): Performed by: NURSE PRACTITIONER

## 2022-09-01 PROCEDURE — 97535 SELF CARE MNGMENT TRAINING: CPT

## 2022-09-01 PROCEDURE — 71045 X-RAY EXAM CHEST 1 VIEW: CPT

## 2022-09-01 PROCEDURE — 2500000003 HC RX 250 WO HCPCS: Performed by: NURSE PRACTITIONER

## 2022-09-01 PROCEDURE — 80048 BASIC METABOLIC PNL TOTAL CA: CPT

## 2022-09-01 PROCEDURE — 97162 PT EVAL MOD COMPLEX 30 MIN: CPT

## 2022-09-01 PROCEDURE — 99233 SBSQ HOSP IP/OBS HIGH 50: CPT | Performed by: PSYCHIATRY & NEUROLOGY

## 2022-09-01 PROCEDURE — 99221 1ST HOSP IP/OBS SF/LOW 40: CPT | Performed by: PHYSICAL MEDICINE & REHABILITATION

## 2022-09-01 PROCEDURE — 97116 GAIT TRAINING THERAPY: CPT

## 2022-09-01 PROCEDURE — 92523 SPEECH SOUND LANG COMPREHEN: CPT

## 2022-09-01 PROCEDURE — 2000000000 HC ICU R&B

## 2022-09-01 PROCEDURE — 36415 COLL VENOUS BLD VENIPUNCTURE: CPT

## 2022-09-01 PROCEDURE — 85027 COMPLETE CBC AUTOMATED: CPT

## 2022-09-01 PROCEDURE — 97166 OT EVAL MOD COMPLEX 45 MIN: CPT

## 2022-09-01 PROCEDURE — 97530 THERAPEUTIC ACTIVITIES: CPT

## 2022-09-01 PROCEDURE — 70450 CT HEAD/BRAIN W/O DYE: CPT

## 2022-09-01 RX ORDER — ASPIRIN 81 MG/1
81 TABLET, CHEWABLE ORAL DAILY
Status: DISCONTINUED | OUTPATIENT
Start: 2022-09-01 | End: 2022-09-02 | Stop reason: HOSPADM

## 2022-09-01 RX ORDER — ENOXAPARIN SODIUM 100 MG/ML
40 INJECTION SUBCUTANEOUS DAILY
Status: DISCONTINUED | OUTPATIENT
Start: 2022-09-01 | End: 2022-09-02 | Stop reason: HOSPADM

## 2022-09-01 RX ADMIN — PANTOPRAZOLE SODIUM 40 MG: 40 TABLET, DELAYED RELEASE ORAL at 08:32

## 2022-09-01 RX ADMIN — ENOXAPARIN SODIUM 40 MG: 100 INJECTION SUBCUTANEOUS at 19:30

## 2022-09-01 RX ADMIN — Medication 10 MG: at 21:03

## 2022-09-01 RX ADMIN — ASPIRIN 81 MG: 81 TABLET, CHEWABLE ORAL at 19:30

## 2022-09-01 RX ADMIN — ATORVASTATIN CALCIUM 80 MG: 80 TABLET, FILM COATED ORAL at 19:31

## 2022-09-01 ASSESSMENT — ENCOUNTER SYMPTOMS
BACK PAIN: 0
PHOTOPHOBIA: 0
COUGH: 0
SHORTNESS OF BREATH: 0
ABDOMINAL PAIN: 0

## 2022-09-01 NOTE — CONSULTS
Physical Medicine & Rehabilitation  Consult Note      Admitting Physician:   Clemente Ventura MD    Primary Care Provider:   No primary care provider on file. Reason for Consult:  Acute Inpatient Rehabilitation    Chief Complaint: Worsened L sided weakness    History of Present Illness:  Referring Provider is requesting an evaluation for appropriate placement upon discharge from acute care. History from chart review and patient. Brandon Lopes is a 74 y.o. RHD male admitted to [facility] on 8/31/2022. Patient was transferred from [facility] ED s/p tPA administration for acute stroke like symptoms of L sided weakness and numbness/tingling. Initial NIHSS 4. CT showed R thalamic infarct. Loaded with Keppra and treated with ASA.      Review of Systems:  Constitutional: negative for anorexia, chills, fatigue, fevers, sweats and weight loss  Eyes: negative for redness and visual disturbance  Ears, nose, mouth, throat, and face: negative for earaches, sore throat and tinnitus  Respiratory: negative for cough and shortness of breath  Cardiovascular: negative for chest pain, dyspnea and palpitations  Gastrointestinal: negative for abdominal pain, change in bowel habits, constipation, nausea and vomiting  Genitourinary:negative for dysuria, frequency, hesitancy and urinary incontinence  Integument/breast: negative for pruritus and rash  Musculoskeletal:negative for stiff joints  Neurological: negative for dizziness, headaches   Behavioral/Psych: negative for decreased appetite, depression        Premorbid function:  Independent    Current function:  Restrictions/ Precautions-  Restrictions/Precautions  Restrictions/Precautions: Fall Risk  Required Braces or Orthoses?: No    PT:    Bed Mobility-      Bed Mobility Training  Bed Mobility Training: Yes  Overall Level of Assistance: Minimum assistance, Additional time  Interventions: Safety awareness training, Verbal cues  Supine to Sit: Minimum assistance, Additional time (pt completed supine > sit with Min A to assist with part of trunk progression. Pt requires significant increased time and effort to obtain upright position)  Sit to Supine:  (pt returned to seated in recliner at end of session)     Transfers-      Transfer Training  Transfer Training: Yes  Overall Level of Assistance: Contact-guard assistance, Additional time, Adaptive equipment  Interventions: Safety awareness training  Sit to Stand: Contact-guard assistance, Additional time, Adaptive equipment     Ambulation-              OT:   ADLS-   ADL  Feeding: Modified independent , Setup  Grooming: Modified independent , Setup  UE Bathing: Stand by assistance, Setup, Increased time to complete  LE Bathing: Minimal assistance, Setup, Increased time to complete  UE Dressing: Stand by assistance, Setup, Increased time to complete  LE Dressing: Minimal assistance, Setup, Increased time to complete  LE Dressing Skilled Clinical Factors: pt attempted to reach to foot level to don B socks however noted increased anxiety when reaching stating fear of falling requiring Min A to don socks at this time. Pt donned hospital pants requiring Min A to thread BLE, pt able to pull from lower leg up over hips in standing and tied pants with CGA  Toileting: Contact guard assistance, Setup, Increased time to complete     SLP:  Pt presents with mild cognitive deficits characterized by difficulty with short term memory. Pt reports he is functioning at his baseline at this time. Pt with no dysarthria, no O/M weakness noted. No further ST recommended at this time. Past Medical History:    History reviewed. No pertinent past medical history.     Past Surgical History:        Procedure Laterality Date    BRAIN ANEURYSM SURGERY      UNKNOWN BRAIN ANEURYSM CLIP FROM 20+ YEARS AGO, NO INFORMATION, NO MRI - EL 9/1/22       Allergies:    No Known Allergies     Current Medications:   Current Facility-Administered Medications: aspirin chewable tablet 81 mg, 81 mg, Oral, Daily  enoxaparin (LOVENOX) injection 40 mg, 40 mg, SubCUTAneous, Daily  ondansetron (ZOFRAN-ODT) disintegrating tablet 4 mg, 4 mg, Oral, Q8H PRN **OR** ondansetron (ZOFRAN) injection 4 mg, 4 mg, IntraVENous, Q6H PRN  polyethylene glycol (GLYCOLAX) packet 17 g, 17 g, Oral, Daily PRN  perflutren lipid microspheres (DEFINITY) injection 1.65 mg, 1.5 mL, IntraVENous, ONCE PRN  atorvastatin (LIPITOR) tablet 80 mg, 80 mg, Oral, Nightly  labetalol (NORMODYNE;TRANDATE) injection 10 mg, 10 mg, IntraVENous, Q1H PRN  pantoprazole (PROTONIX) tablet 40 mg, 40 mg, Oral, QAM AC  acetaminophen (TYLENOL) tablet 650 mg, 650 mg, Oral, Q4H PRN    Social History:  Lives With: Alone  Type of Home: House  Home Layout: Two level, Bed/Bath upstairs (Full bath on main level also. Full flight of stairs to bed/bath with right rail)  Home Access: Stairs to enter with rails  Entrance Stairs - Number of Steps: 5 steps in front with keira rails and 6 steps in back with left rail  Bathroom Shower/Tub: Walk-in shower, Tub/Shower unit (Walk-in shower upstairs which is what pt uses)  Bathroom Toilet: Standard  Bathroom Equipment: Shower chair (Pt states he puts shower chair outside of shower to put his socks on)  Home Equipment: Lombardo Knife, rolling (Bed rail; use of cane outside of home)  Has the patient had two or more falls in the past year or any fall with injury in the past year?:  (Pt reports one recent fall forward while bending over to get ice)  Receives Help From:  (Pt reports multiple supportive family members who live close by.  Pt notes ability to have someone who could come stay with pt 24/7.)  ADL Assistance: [address] [place] [id number] Avenue: Independent  Homemaking Responsibilities: Yes  Meal Prep Responsibility: Primary  Laundry Responsibility: Primary  Cleaning Responsibility: Primary  Shopping Responsibility: Primary  Ambulation Assistance: Independent  Transfer Assistance: Independent  Active : No  Mode of Transportation: Family  Occupation: Retired  Type of Occupation:   Leisure & Hobbies: Pt enjoys sewing  Social History     Socioeconomic History    Marital status: Unknown     Spouse name: None    Number of children: None    Years of education: None    Highest education level: None       Family History:   History reviewed. No pertinent family history. Diagnostics:    CBC:   Recent Labs     08/31/22  1604 09/01/22  0636   WBC 11.8* 11.7*   RBC 5.89* 4.89   HGB 15.6 13.5   HCT 47.8 40.0*   MCV 81.2* 81.8*   RDW 16.4* 16.5*    170     BMP:    Recent Labs     08/31/22  1604 09/01/22  0636   GLUCOSE 101* 98   BUN 20 17   CREATININE 0.75 0.73   CALCIUM 8.7 8.4*   * 135   K 3.9 4.0    107   CO2 20 21   ANIONGAP 11 7*   LABGLOM >60 >60   GFRAA >60 >60   GFR               HbA1c:   Lab Results   Component Value Date    LABA1C 5.7 08/31/2022     BNP: No results for input(s): BNP in the last 72 hours. PT/INR:   Recent Labs     08/31/22  1604   PROTIME 11.6   INR 1.1     APTT:   Recent Labs     08/31/22  1604   APTT 26.4     CARDIAC ENZYMES:   Recent Labs     08/31/22  1604   TROPHS 15      FASTING LIPID PANEL:  Lab Results   Component Value Date    CHOL 87 08/31/2022    HDL 36 (L) 08/31/2022    TRIG 64 08/31/2022     LIVER PROFILE: No results for input(s): AST, ALT, ALB, BILIDIR, BILITOT, ALKPHOS in the last 72 hours. Radiology:    CT HEAD 9/1/22  FINDINGS:   BRAIN/VENTRICLES: An age-indeterminate but suspected remote lacunar infarct   within the right thalamus is noted. There is no acute infarct or acute   intracranial hemorrhage. There is no mass effect or midline shift. The   sequelae of right temporal craniotomy and right MCA aneurysm clipping is   noted. There is encephalomalacia within the right temporal lobe, unchanged. There is no ventriculomegaly or abnormal extra-axial fluid collection   present.   Hypoattenuation is present within the periventricular and deep   white matter of both hemispheres. ORBITS: Limited evaluation of the orbits is unremarkable. SINUSES: The paranasal sinuses and mastoid air cells are clear. SOFT TISSUES/SKULL:  The sequelae of right temporal craniotomy is noted. No   lytic or blastic osseous lesions are identified. Impression:       1. Age-indeterminate but suspected remote lacunar infarct within the right   thalamus. 2. No acute intracranial hemorrhage identified. 3. Sequelae of right MCA aneurysm clipping. CTA HEAD NECK 9/1/22  FINDINGS:     CTA NECK:     AORTIC ARCH/ARCH VESSELS: No dissection or arterial injury. No significant   stenosis of the brachiocephalic or subclavian arteries. CAROTID ARTERIES: No dissection, arterial injury, or hemodynamically   significant stenosis by NASCET criteria. VERTEBRAL ARTERIES: The left vertebral artery is dominant and is patent. There appears to be a critical stenosis at the origin of the right vertebral   artery with attenuated flow and eventual occlusion at the level of the C3   vertebral body. There is reconstitution of flow beginning at the V3/V4   junction, likely filling in retrograde fashion. SOFT TISSUES: The lung apices are clear. No cervical or superior mediastinal   lymphadenopathy. The larynx and pharynx are unremarkable. No acute   abnormality of the salivary and thyroid glands. BONES: No acute osseous abnormality. CTA HEAD:     ANTERIOR CIRCULATION: No significant stenosis of the intracranial internal   carotid, anterior cerebral, or middle cerebral arteries. Status post right   MCA aneurysm clipping, without evidence of aneurysmal remnant. POSTERIOR CIRCULATION: No significant stenosis of the vertebral, basilar, or   posterior cerebral arteries. No aneurysm. OTHER: No dural venous sinus thrombosis on this non-dedicated study. BRAIN: See separately dictated noncontrast head CT report.        Impression:       Occluded right vertebral artery with reconstitution of flow distally;   age-indeterminate. Status post right MCA aneurysm clipping, without evidence of aneurysmal   remnant. Physical Exam:  BP (!) 164/133   Pulse 58   Temp 98.4 °F (36.9 °C) (Oral)   Resp 12   Ht 5' 6\" (1.676 m)   Wt 147 lb 14.4 oz (67.1 kg)   SpO2 96%   BMI 23.87 kg/m²     GEN: Well developed, well nourished, no acute distress. HEENT: NCAT, PERRL, EOMI, mucous membranes pink and moist.  RESP: Lungs clear to auscultation. No rales or rhonchi. Respirations WNL and unlabored. CV: bradycardic rate regular rhythm. No murmurs, rubs, or gallops. ABD: soft, non-distended, non-tender. BS+ and equal.  NEURO: A&O x 3. Sensation intact to light touch. DTRs 2+  MSK: Functional ROM. Muscle tone and bulk are normal bilaterally. Strength 5/5 key muscles BUEs and RLE. 4/5 key muscles LLE.   EXT: No calf tenderness to palpation or edema BLEs. SKIN: Warm dry and intact with good turgor. PSYCH: Mood WNL. Affect WNL. Appropriately interactive. Impression:  Acute stroke symptoms  Subclinical seizures  HTN/HLD/CAD  Hx R MCA aneurysm clipping    Recommendations:    Diagnosis:  Stroke with L sided hemiparesis  Therapy: Has PT/OT needs  Medical Necessity: As above  Support: Has supportive family who live nearby  Rehab Recommendation: The patient will benefit from acute inpatient rehabilitation once medically stable per primary and consulting services. Anticipate he will be able to tolerate 3 hours of therapy per day or 900 minutes per week in rehabilitation. The patient requires multidisciplinary rehabilitation treatment including medical management by a PM&R physician, 24 hour rehabilitation nursing, Physical/Occupational therapy, rehabilitation social work, and nutrition services.  Patient and family also require education in post-hospital precautions and home exercise routine, adaptive techniques and deficit compensation strategies, strengthening and conditioning, equipment prescription and instructions in use. DVT Prophylaxis: on Lovenox    It was my pleasure to evaluate Manuel Plater today. Please call with questions. Herminia Mccarthy MD        This note is created with the assistance of a speech recognition program.  While intending to generate a document that actually reflects the content of the visit, the document can still have some errors including those of syntax and sound a like substitutions which may escape proof reading. In such instances, actual meaning can be extrapolated by contextual diversion.

## 2022-09-01 NOTE — PROGRESS NOTES
Occupational Therapy  Facility/Department: [address] NEURO ICU  Occupational Therapy Initial Assessment    Name: Edelmira Adi  : 1950  MRN: [id number]  Date of Service: 2022    Chief Complaint   Patient presents with    Cerebrovascular Accident       Discharge Recommendations:  Patient would benefit from continued therapy after discharge, Outpatient OT          Patient Diagnosis(es): The encounter diagnosis was Cerebrovascular accident (CVA), unspecified mechanism ([facility] [address] Past Medical History:  has no past medical history on file. Past Surgical History:  has no past surgical history on file. Assessment   Performance deficits / Impairments: Decreased functional mobility ; Decreased ADL status; Decreased strength;Decreased balance;Decreased high-level IADLs;Decreased coordination  Assessment: Pt agreeable to OT eval this date. Pt completed supine > sit requiring Min A for part of trunk progression. Pt engaged in static and dynamic sitting at EOB and donned B socks and hospital pants requiring Min A when attempting to reach to foot level secondary to anxiety/fear of falling as well as slightly decreased balance. Pt completed functional transfers and functional mobility with both use of RW and IV pole for support. Pt exhibits slight LUE strength deficits as well as coordination deficits. Pt ed on strengthening ex for LUE with use of stress ball. Pt will continue to benefit from OT services to address deficits listed in order to regain independence and safety with ADLs/IADLs and functional transfers/mobility.   Prognosis: Good  Decision Making: Medium Complexity  REQUIRES OT FOLLOW-UP: Yes  Activity Tolerance  Activity Tolerance: Patient Tolerated treatment well        Plan   Plan  Times per Week: 3-4 x/wk  Current Treatment Recommendations: Strengthening, Balance training, Functional mobility training, Safety education & training, Patient/Caregiver education & training, Self-Care / ADL, Home management training, Coordination training     Restrictions  Restrictions/Precautions  Restrictions/Precautions: Fall Risk  Required Braces or Orthoses?: No  Position Activity Restriction  Other position/activity restrictions: SBP<180, s/p TPA administration 8/31/22    Subjective   General  Patient assessed for rehabilitation services?: Yes  Family / Caregiver Present: Yes (sister and niece present throughout therapy evaluation)  General Comment  Comments: RN ok'd pt for OT eval this date. Pt agreeable to session and very pleasant/cooperative throughout. Pt denies pain    Social/Functional History  Social/Functional History  Lives With: Alone  Type of Home: House  Home Layout: Two level, Bed/Bath upstairs (Full bath on main level also. Full flight of stairs to bed/bath with right rail)  Home Access: Stairs to enter with rails  Entrance Stairs - Number of Steps: 5 steps in front with keira rails and 6 steps in back with left rail  Bathroom Shower/Tub: Walk-in shower, Tub/Shower unit (Walk-in shower upstairs which is what pt uses)  Bathroom Toilet: Standard  Bathroom Equipment: Shower chair (Pt states he puts shower chair outside of shower to put his socks on)  Home Equipment: Bishop Aguilar, rolling (Bed rail; use of cane outside of home)  Has the patient had two or more falls in the past year or any fall with injury in the past year?:  (Pt reports one recent fall forward while bending over to get ice)  Receives Help From:  (Pt reports multiple supportive family members who live close by.  Pt notes ability to have someone who could come stay with pt 24/7.)  ADL Assistance: [facility] [address]: Independent  Homemaking Responsibilities: Yes  Meal Prep Responsibility: Primary  Laundry Responsibility: Primary  Cleaning Responsibility: Primary  Shopping Responsibility: Primary  Ambulation Assistance: Independent  Transfer Assistance: Independent  Active : No  Mode of Transportation: Family  Occupation: Retired  Type of Occupation:   Leisure & Hobbies: Pt enjoys sewing       Objective  Safety Devices  Type of Devices: Call light within reach; Chair alarm in place;Gait belt;Left in chair  Restraints  Restraints Initially in Place: No    Bed Mobility Training  Bed Mobility Training: Yes  Overall Level of Assistance: Minimum assistance; Additional time  Interventions: Safety awareness training;Verbal cues  Supine to Sit: Minimum assistance; Additional time (pt completed supine > sit with Min A to assist with part of trunk progression. Pt requires significant increased time and effort to obtain upright position)  Sit to Supine:  (pt returned to seated in recliner at end of session)  Balance  Sitting: With support (pt engaged in dynamic sitting at EOB attempting to reach to foot level requiring CGA. Pt noted mild anxiety and fearful of falling while attempting to reach)  Standing: With support (CGA for static and dynamic standing while pulling up pants, tying pants, and standing in room to converse with therapist with RW use of utilizing IV pole for support)  Transfer Training  Transfer Training: Yes  Overall Level of Assistance: Contact-guard assistance; Additional time; Adaptive equipment  Interventions: Safety awareness training  Sit to Stand: Contact-guard assistance; Additional time; Adaptive equipment  Gait  Overall Level of Assistance: Contact-guard assistance; Adaptive equipment (pt initially completed functional mobility within hospital room and short distance within hallway with CGA and RW.  Pt then completed functional mobility with only pushing IV pole and was educated on increasing step length)  Interventions: Safety awareness training    AROM: Within functional limits (L shoulder somewhat limited however remains functional)  Strength: Generally decreased, functional (RUE grossly 4+/5, L shoulder 3+/5, L elbow 4/5, L wrist flex/ext 4/5, L hand 4-/5)  Coordination: Generally decreased, functional (slightly decreased speed and accuracy noted with L hand when compared to R with opposition testing)  Tone: Normal  Sensation: Intact (pt denies numbness/tingling at this time)    ADL  Feeding: Modified independent ;Setup  Grooming: Modified independent ;Setup  UE Bathing: Stand by assistance;Setup; Increased time to complete  LE Bathing: Minimal assistance;Setup; Increased time to complete  UE Dressing: Stand by assistance;Setup; Increased time to complete  LE Dressing: Minimal assistance;Setup; Increased time to complete  LE Dressing Skilled Clinical Factors: pt attempted to reach to foot level to don B socks however noted increased anxiety when reaching stating fear of falling requiring Min A to don socks at this time. Pt donned hospital pants requiring Min A to thread BLE, pt able to pull from lower leg up over hips in standing and tied pants with CGA  Toileting: Contact guard assistance;Setup; Increased time to complete    Activity Tolerance  Activity Tolerance: Patient tolerated treatment well  Activity Tolerance Comments: Pleasant, cooperative and motivated throughout session    Vision  Vision: Impaired  Vision Exceptions: Wears glasses at all times  Hearing  Hearing: Within functional limits  Cognition  Overall Cognitive Status: WFL  Orientation  Overall Orientation Status: Within Functional Limits    Exercise Treatment: stress ball for strengthening  Resistive Exercises: pt ed to: squeeze stress ball, press down on stress ball against steady surface, pinch stress ball via thumb to each digit, squeeze stressball between all digits for adb/adduction strengthening. Good return and demo noted    Education Provided Comments: Pt ed on OT role, OT POC, safety awareness, strengthening ex to LUE/L hand utilizing stress ball, transfer training, fall prevention, adaptive dressing tech, and importance of continued OT.  Pt demonstrates good understanding    LUE AROM (degrees)  LUE AROM : Exceptions  LUE General AROM: shoulder limited possibly secondary to strength; all other joints WFL  L Shoulder Flexion 0-180: 0-110  Left Hand AROM (degrees)  Left Hand AROM: WFL  RUE AROM (degrees)  RUE AROM : WFL  Right Hand AROM (degrees)  Right Hand AROM: WFL       Hand Dominance  Hand Dominance: Right            AM-PAC Score        AM-PAC Inpatient Daily Activity Raw Score: 20 (09/01/22 1500)  AM-PAC Inpatient ADL T-Scale Score : 42.03 (09/01/22 1500)  ADL Inpatient CMS 0-100% Score: 38.32 (09/01/22 1500)  ADL Inpatient CMS G-Code Modifier : CJ (09/01/22 1500)    Goals  Short Term Goals  Time Frame for Short term goals: By discharge, pt will:  Short Term Goal 1: Demo SUP for functional transfers and functional mobility for engagement in ADLs/IADLs  Short Term Goal 2: Demo Mod I for all ADLs utilizing adaptive tech PRN  Short Term Goal 3:  Increase LUE strength by 1/2+ muscle grade for improved functional use for ADL/IADL participation  Short Term Goal 4: Engage in 5+ min [facility] task to L hand with <2 fumbles of items to increase coordination for meaningful occupation performance  Short Term Goal 5: Demo 10+ min dynamic standing and reaching activity with SUP for improved balance during ADL/IADLs       Therapy Time   Individual Concurrent Group Co-treatment   Time In 1335         Time Out 1410 (+3 min for ed on stress ball use for strengthening)         Minutes 35         Timed Code Treatment Minutes: 23 Minutes       Maciel Teran, OTR/L

## 2022-09-01 NOTE — PROGRESS NOTES
Daily Progress Note  Neuro Critical Care      Patient Name: Arminda Sewell  Patient : 1950  Room/Bed: 0117/0117-01  Allergies: No Known Allergies  Problem List:   Patient Active Problem List   Diagnosis    Stroke-like symptoms    Cerebrovascular accident (CVA) ([address]    Received tissue plasminogen activator (t-PA) less than 24 hours prior to arrival    Acute left hemiparesis (HCC)    Nihss score 4     This is a delayed entry note and reflect's the patient's condition and management plan at time of service. INTERVAL HISTORY:    Briefly, Patient is a 74year old male with PMHx of HTN, HLD, CAD, and R MCA aneurysm clipping in  who was admitted on  as a transfer from [place] ED for acute left sided weakness and paresthesias s/p TPA administration. LKW ~10:00 AM on , tPA infusion completed at 15:26, and patient transferred for post tPA management. CT Head wo contrast showed age-indeterminate lacunar infarct within right thalamus. 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 Pt initially presented via EMS to outlying facility with complaints of sudden onset left arm numbness and tingling of sudden onset while eating breakfast and found with NIH of 4 and SBP in 150s. On initial evaluation by [place] team, pt found with intact mentation and NIH of 4 for mild left UE and left LE drift (limbs not hitting bed) and decreased sensation of LUE and LLE. Today, patient was seen and examined. No new complaints. No acute events overnight.      CURRENT MEDICATIONS:  SCHEDULED MEDICATIONS:   ondansetron  4 mg IntraVENous Once    atorvastatin  80 mg Oral Nightly    pantoprazole  40 mg Oral QAM AC     CONTINUOUS INFUSIONS:   sodium chloride 100 mL/hr at 22 1722     PRN MEDICATIONS:   ondansetron **OR** ondansetron, polyethylene glycol, perflutren lipid microspheres, labetalol, acetaminophen    VITALS:  Temperature Range: Temp: 98.6 °F (37 °C) Temp  Av.4 °F (36.9 °C)  Min: 98.2 °F (36.8 °C)  Max: 98.8 °F (37.1 °C)  BP Range: Systolic (59PVX), NVQ:051 , Min:124 , WAU:714     Diastolic (45TZM), MEB:67, Min:50, Max:97    Pulse Range: Pulse  Av.7  Min: 46  Max: 80  Respiration Range: Resp  Av.6  Min: 9  Max: 18  Current Pulse Ox: SpO2: 95 %  24HR Pulse Ox Range: SpO2  Av.1 %  Min: 93 %  Max: 96 %  Patient Vitals for the past 12 hrs:   BP Temp Temp src Pulse Resp SpO2   22 0600 (!) 141/52 98.6 °F (37 °C) -- (!) 49 (!) 9 95 %   22 0500 (!) 124/50 98.4 °F (36.9 °C) -- (!) 46 11 95 %   22 0400 126/60 98.6 °F (37 °C) -- (!) 48 (!) 9 94 %   22 0300 (!) 142/50 98.2 °F (36.8 °C) -- 53 10 94 %   22 0200 (!) 158/66 98.3 °F (36.8 °C) -- 58 10 96 %   22 0100 (!) 153/69 98.4 °F (36.9 °C) -- 68 16 96 %   22 0030 (!) 139/54 -- -- 56 12 94 %   22 0000 (!) 132/97 98.3 °F (36.8 °C) -- 59 12 95 %   22 2330 134/64 -- -- 58 14 96 %   22 2300 (!) 136/56 98.6 °F (37 °C) -- 55 11 95 %   22 2230 (!) 147/58 -- -- 51 10 96 %   22 2200 (!) 134/57 98.4 °F (36.9 °C) -- 56 10 95 %   22 2130 (!) 139/55 -- -- 51 11 96 %   22 135/66 98.3 °F (36.8 °C) -- 54 11 95 %   22 (!) 141/57 -- -- 57 12 96 %   22 (!) 139/57 98.2 °F (36.8 °C) Oral 58 12 96 %   22 1900 (!) 141/60 -- -- 65 13 --   22 1850 -- -- -- 71 16 96 %     Estimated body mass index is 23.87 kg/m² as calculated from the following:    Height as of this encounter: 5' 6\" (1.676 m).     Weight as of this encounter: 147 lb 14.4 oz (67.1 kg).  []<16 Severe malnutrition  []16-16.99 Moderate malnutrition  []17-18.49 Mild malnutrition  [x]18.5-24.9 Normal  []25-29.9 Overweight (not obese)  []30-34.9 Obese class 1 (Low Risk)  []35-39.9 Obese class 2 (Moderate Risk)  []?40 Obese class 3 (High Risk)    RECENT LABS:   Lab Results   Component Value Date    WBC 11.8 (H) 08/31/2022    HGB 15.6 08/31/2022    HCT 47.8 08/31/2022     08/31/2022    CHOL 87 08/31/2022    TRIG 64 08/31/2022    HDL 36 (L) 08/31/2022    ALT 14 02/09/2022    AST 28 02/09/2022     (L) 08/31/2022    K 3.9 08/31/2022     08/31/2022    CREATININE 0.75 08/31/2022    BUN 20 08/31/2022    CO2 20 08/31/2022    TSH 1.76 02/09/2022    PSA 3.75 02/09/2022    INR 1.1 08/31/2022    LABA1C 5.7 08/31/2022     24 HOUR INTAKE/OUTPUT:  Intake/Output Summary (Last 24 hours) at 9/1/2022 0622  Last data filed at 8/31/2022 2237  Gross per 24 hour   Intake --   Output 675 ml   Net -675 ml       RADIOLOGY:   Labs and Images reviewed with:  [] Rupinder Bustillos MD         [] Alicia Gonzalez MD     [] Sharif Correa MD  [] Whit Song MD  [] Bebe Bustamante MD  [] Heather Garcia MD    [x] there are no new interval images to review. PHYSICAL EXAM:  CONSTITUTIONAL:  Well developed, well nourished, alert and oriented x 3, in no acute distress. GCS 15. Nontoxic.     HEAD:  normocephalic, atraumatic    EYES:  PERRLA, EOMI.   ENT:  moist mucous membranes   NECK:  supple, symmetric   LUNGS:  No respiratory distress   CARDIOVASCULAR:  RRR   NEUROLOGIC:  Mental Status:  A & O x3,awake             Cranial Nerves:    cranial nerves II-XII are grossly intact    Motor Exam:    Drift:  absent  Tone:  normal    5/5 strength in right upper and right lower extremities in distal and proximal muscle groups  4/5 strength in left upper and left lower extremities in distal and proximal muscle groups    Sensory:  normal sensation to touch in all 4 extremities    Deep Tendon Reflexes: 2+ and symmetrical throughout     Clonus:  absent    Coordination/Dysmetria:  Finger to Nose:   Right:  normal  Left:  normal     Gait:  Not tested       ASSESSMENT / PLAN / MEDICAL DECISION MAKING:  Case of a 74year old male with PMHx of HTN, HLD, CAD, and R MCA aneurysm clipping in 2001 who was admitted on 8/31 as a transfer from [place] ED for post tPA management after presenting with acute left sided weakness and paresthesias. LKW around 10 AM on 8/31. tPA infusion completed at 15: 26.  CT head without contrast showed age-indeterminate lacunar infarct within right thalamus. CTA H/N showed age-indeterminate occlusion of right vertebral artery with reconstitution of flow distally and status post right MCA aneurysm clipping without evidence of aneurysmal remnant. Patient is being managed for right thalamic stroke & has been stable post tPA administration yesterday afternoon. Plan is to obtain information regarding compatibility of aneurysmal clipping with MRI & obtain MRI if compatible, otherwise obtain repeat CT head. Further recommendations to be determined pending repeat imaging. NEUROLOGIC:  Exam: Improved  AED: Initially loaded with 1000 mg Keppra IV; will not continue as patient has no history of seizures and symptoms secondary to thalamic stroke  If aneurysm clipping is MRI compatible => obtain MRI at 24 hours post tPA cherise  If clipping not compatible with MRI or compatibility indeterminate => repeat CT head 24 hours status post tPA  Decide on [facility] & DVT ppx pending repeat imaing  PT/OT  PM&R consult  Atorvastatin 80 mg    CARDIOVASCULAR:  HR stable; mostly in 50s and 60s with a few recorded pulses in high 40s  Goals SBP <180  I and O net since admit -1,375  Echo reports with EF%    PULMONARY:  On vent No  RR in low teens  O2 Sat in mid 90s on RA  CXR 9/1: no acute findings    RENAL/FLUID/ELECTROLYTE:  Urine output 400 per chart  BUN and Creat: 17 and 0.73 respectively  IVF: type and rate  Na level 135    GI/NUTRITION:  NUTRITION:  ADULT DIET;  Regular  SLP on board  GI Prophylaxis: Pantoprazole 40 mg    ID:  Afebrile in past 24 hours  WBC: 11.7 (from 11.8 yesterday)  Continue monitoring CBC    HEMATOLOGIC:  H/H: 13.5/40.0  Platelet: 341    ENDOCRINE:  Blood sugars within normal limits      PROPHYLAXIS:  Stress ulcer: PPI    DVT PROPHYLAXIS:  - SCD for now; can be reassessed after repeat imaging results review  - s/p TPA      DISPOSITION:  [x] To remain ICU: anticipate potential transfer to stepdown later today pending repeat head imaging   [] OK for out of ICU from Neuro Critical Care standpoint    We will continue to follow along. Please contact neuro critical care with any changes in exam or patient status.         Ant Vidal, DO  Neuro Critical Care  9/1/2022     6:22 AM

## 2022-09-01 NOTE — CARE COORDINATION
09/01/22 1653   Service Assessment   Patient Orientation Alert and Oriented   Cognition Alert   Primary Caregiver Self   Support Systems Family Members   PCP Verified by CM No   Current Functional Level Independent in ADLs/IADLs   Can patient return to prior living arrangement Yes   Ability to make needs known: Good   Family able to assist with home care needs: Yes   Social/Functional History   Lives With Spouse   Home Layout Two level   Home Access Stairs to enter with rails   Entrance Stairs - Number of Steps 4   Entrance Stairs - Rails Both   Bathroom Shower/Tub Tub/Shower unit   Bathroom Toilet Standard   Receives Help From Family   ADL Assistance Independent   Homemaking Assistance Independent   Meal Prep Responsibility Primary   Laundry Responsibility Primary   Cleaning Responsibility Primary   Shopping Responsibility Primary   Ambulation Assistance Independent   Transfer Assistance Independent   Active  No   Patient's  Info Has Never Driven   Discharge Planning   Type of [address] Medications No   Patient expects to be discharged to: [facility] [address] Two [address]   Lift Chair Available No   History of falls? 0   Services At/After Discharge   [employer] Information Provided? No   Mode of Transport at Discharge   (Family)   Confirm Follow Up Transport Family  (Family)   Condition of Participation: Discharge Planning   The Plan for Transition of Care is related to the following treatment goals: Feel better and return home   Freedom of Choice list was provided with basic dialogue that supports the patient's individualized plan of care/goals, treatment preferences, and shares the quality data associated with the providers?   No       Face Sheet verified by patient    Discharge Plan: Home , has transportation, looking for a PCP

## 2022-09-01 NOTE — PROGRESS NOTES
[facility]     Emergency/Trauma Note    PATIENT NAME: Demetria Woodard    Shift date: 8.31.2022  Shift day: Wednesday   Shift # 2    Room # 0117/0117-01   Name: Demetria Woodard            Age: 74 y.o. Gender: male          Scientology: No Mandaeism on file   Place of Protestant: unknown    Trauma/Incident type: Stroke Alert  Admit Date & Time: 8/31/2022  3:42 PM  TRAUMA NAME: None    ADVANCE DIRECTIVES IN CHART? No    NAME OF DECISION MAKER: None    RELATIONSHIP OF DECISION MAKER TO PATIENT: None    PATIENT/EVENT DESCRIPTION:  Demetria Woodard is a 74 y.o. male who arrived as a [address] with stroke-like symptoms. Pt to be admitted to 0117/0117-01. SPIRITUAL ASSESSMENT-INTERVENTION-OUTCOME:  Patient appears to be calm and coping. Family has been bedside for support.  provided space for feelings, thoughts, and concerns. Determined family support is available. Patient receptive to care and support. PATIENT BELONGINGS:  No belongings noted    ANY BELONGINGS OF SIGNIFICANT VALUE NOTED:  None    REGISTRATION STAFF NOTIFIED? Yes      WHAT IS YOUR SPIRITUAL CARE PLAN FOR THIS PATIENT?:  Chaplains will remain available to offer spiritual and emotional support as needed. Electronically signed by Jasmine Tobias on 9/1/2022 at [facility]  [phone]     08/31/22 1830   Encounter Summary   Service Provided For: Patient   Referral/Consult From: Multi-disciplinary team   Support System Family members   Last Encounter  08/31/22   Complexity of Encounter Moderate   Begin Time 1830   End Time  1845   Total Time Calculated 15 min   Encounter    Type Initial Screen/Assessment   Crisis   Type Code Stroke   Assessment/Intervention/Outcome   Assessment Calm;Coping   Intervention Active listening;Discussed illness injury and its impact; Explored/Affirmed feelings, thoughts, concerns;Explored Coping Skills/Resources Outcome Engaged in conversation;Expressed feelings, needs, and concerns     Electronically signed by Lisa Burton on 9/1/2022 at 1:39 AM

## 2022-09-01 NOTE — CARE COORDINATION
PHQ-9  Pt  initially presented to [facility] ED with acute onset left sided weakness and numbness/tingling. Met with pt this a.m. to assess for support and needs , was very pleasant and cooperative. Pt states he lives alone. Has a lot of family that lives close by  [address], nephews. Pt has no children. Pt reports he has been eating less due to nausea. Pt also states he has been feeling more tired lately. Pt denies having any feeling os depression or suicidal ideations. Patient Health Questionnaire-9 (PHQ-9)    Over the last 2 weeks, how often have you been bothered by any of the following problems? 1. Little Interest or pleasure in doing things? [x] Not at all  [] Several Days  [] More than half the day  []  Nearly every day    2. Feeling down, depressed or hopeless? [x] Not at all  [] Several Days  [] More than half the day  []  Nearly every day    3. Trouble falling or staying asleep, or sleeping too much? [x] Not at all  [] Several Days  [] More than half the day  []  Nearly every day    4. Feeling tired or having little energy? [] Not at all  [x] Several Days  [] More than half the day  []  Nearly every day    5. Poor apettite or overeating? [] Not at all  [x] Several Days  [] More than half the day  []  Nearly every day    6. Feeling bad about yourself-or that you are a failure or have let yourself or your family down? [x] Not at all  [] Several Days  [] More than half the day  []  Nearly every day    7. Trouble concentrating on things, such as reading the newspaper or watching television? [x] Not at all  [] Several Days  [] More than half the day  []  Nearly every day    8. Moving or speaking so slowly that other people could have noticed? Or the opposite-being so fidgety or restless that you have been moving around a lot more than usual?   [x] Not at all  [] Several Days  [] More than half the day  []  Nearly every day    9.  Thoughts that you would be better off dead or of

## 2022-09-01 NOTE — PLAN OF CARE
Problem: Discharge Planning  Goal: Discharge to home or other facility with appropriate resources  9/1/2022 0832 by Gabrielle Saunders RN  Outcome: Progressing  Flowsheets (Taken 9/1/2022 0800)  Discharge to home or other facility with appropriate resources: Identify barriers to discharge with patient and caregiver  9/1/2022 0655 by Checo Mathis RN  Outcome: Progressing     Problem: Safety - Adult  Goal: Free from fall injury  9/1/2022 0832 by Gabrielle Saunders RN  Outcome: Progressing  9/1/2022 0655 by Checo Mathis RN  Outcome: Progressing     Problem: ABCDS Injury Assessment  Goal: Absence of physical injury  9/1/2022 0832 by Gabrielle Saunders RN  Outcome: Progressing  9/1/2022 0655 by Checo Mathis RN  Outcome: Progressing     Problem: Neurosensory - Adult  Goal: Achieves stable or improved neurological status  Outcome: Progressing  Flowsheets (Taken 9/1/2022 0800)  Achieves stable or improved neurological status: Assess for and report changes in neurological status  Goal: Absence of seizures  Outcome: Progressing  Flowsheets (Taken 9/1/2022 0800)  Absence of seizures: Monitor for seizure activity.   If seizure occurs, document type and location of movements and any associated apnea  Goal: Remains free of injury related to seizures activity  Outcome: Progressing  Flowsheets (Taken 9/1/2022 0800)  Remains free of injury related to seizure activity: Maintain airway, patient safety  and administer oxygen as ordered  Goal: Achieves maximal functionality and self care  Outcome: Progressing  Flowsheets (Taken 9/1/2022 0800)  Achieves maximal functionality and self care: Monitor swallowing and airway patency with patient fatigue and changes in neurological status     Problem: Skin/Tissue Integrity - Adult  Goal: Skin integrity remains intact  Outcome: Progressing  Goal: Incisions, wounds, or drain sites healing without S/S of infection  Outcome: Progressing  Goal: Oral mucous membranes remain intact  Outcome: Progressing     Problem: Musculoskeletal - Adult  Goal: Return mobility to safest level of function  Outcome: Progressing  Flowsheets (Taken 9/1/2022 0800)  Return Mobility to Safest Level of Function: Assess patient stability and activity tolerance for standing, transferring and ambulating with or without assistive devices  Goal: Maintain proper alignment of affected body part  Outcome: Progressing  Flowsheets (Taken 9/1/2022 0800)  Maintain proper alignment of affected body part: Support and protect limb and body alignment per provider's orders  Goal: Return ADL status to a safe level of function  Outcome: Progressing  Flowsheets (Taken 9/1/2022 0800)  Return ADL Status to a Safe Level of Function: Administer medication as ordered     Problem: Hematologic - Adult  Goal: Maintains hematologic stability  Outcome: Progressing  Flowsheets (Taken 9/1/2022 0800)  Maintains hematologic stability: Assess for signs and symptoms of bleeding or hemorrhage     Problem: Chronic Conditions and Co-morbidities  Goal: Patient's chronic conditions and co-morbidity symptoms are monitored and maintained or improved  Outcome: Progressing  Flowsheets (Taken 9/1/2022 0800)  Care Plan - Patient's Chronic Conditions and Co-Morbidity Symptoms are Monitored and Maintained or Improved: Monitor and assess patient's chronic conditions and comorbid symptoms for stability, deterioration, or improvement

## 2022-09-01 NOTE — H&P
[facility] Neurology   [address] [place] [address]    HISTORY AND PHYSICAL EXAMINATION            Date:   9/2/2022  Patient name:  Robe Church  Date of admission:  8/31/2022  3:42 PM  MRN:   [id number]  Account:  [de-identified]  YOB: 1950  PCP:    No primary care provider on file. Room:   [address] [place] [address]  Code Status:    Full Code    Chief Complaint:     Chief Complaint   Patient presents with    Cerebrovascular Accident   Left-sided numbness, tingling and weakness    History Obtained From:     patient, electronic medical record    History of Present Illness:     A 72/M with PMH significant of HTN, HLD, CAD, R MCA aneurysm (s/p clipping 2001), presented to [facility] with left-sided numbness, tingling and weakness which started while he was having breakfast.  Had difficulty to get up and walk. Last well-known 10 AM.    In [facility], NIH was 4. Had left arm drift, left leg was hitting the bed and he also had left-sided hemisensory loss. CT Head ruled out acute intracranial abnormalities, except age-indeterminate lacunar infarct within the right thalamus. Decision was made to give IV tPA was made at 1400. Infusion ended at 1526  CTA H/N showed right vertebral artery occlusion with distal reconstitution, s/p right MCA aneurysm clip with surrounding encephalomalacia. He was loaded with 1 g Keppra as he was having waxing and waning mentation and concern for subclinical seizures. He was transferred to On license of [facility] and admitted to neuro ICU for post TPA management. Passed bedside swallow evaluation. Has improving left sided weakness. On my evaluation, He is hemodynamically stable, saturating well on room air. He is working with PT/OT, has impaired gait. PM&R consulted for discharge recommendations, recommend inpatient admission to rehab facility. Stable to be transferred to neurology floor. Past Medical History:     History reviewed. No pertinent past medical history. Past Surgical History:     Past Surgical History:   Procedure Laterality Date    BRAIN ANEURYSM SURGERY      UNKNOWN BRAIN ANEURYSM CLIP FROM 20+ YEARS AGO, NO INFORMATION, NO MRI - EL 9/1/22        Medications Prior to Admission:     Prior to Admission medications    Medication Sig Start Date End Date Taking? Authorizing Provider   aspirin 81 MG EC tablet Take 81 mg by mouth daily 3/23/22 3/23/23 Yes Historical Provider, MD   losartan (COZAAR) 50 MG tablet Take 50 mg by mouth daily 5/30/22   Historical Provider, MD   omeprazole (PRILOSEC) 40 MG delayed release capsule Take 40 mg by mouth daily 6/20/22   Historical Provider, MD   rosuvastatin (CRESTOR) 10 MG tablet Take 10 mg by mouth daily 6/18/22   Historical Provider, MD   vitamin D3 (CHOLECALCIFEROL) 25 MCG (1000 UT) TABS tablet Take 1,000 Units by mouth daily 7/6/22   Historical Provider, MD   B Complex Vitamins (B COMPLEX 1 PO) Take 1 tablet by mouth daily    Historical Provider, MD   cyclobenzaprine (FLEXERIL) 10 MG tablet Take 10 mg by mouth daily as needed for Muscle spasms 7/6/22   Historical Provider, MD   furosemide (LASIX) 20 MG tablet Take 20 mg by mouth every other day 7/14/22   Historical Provider, MD        Allergies:     Patient has no known allergies. Social History:     Tobacco:    has no history on file for tobacco use. Alcohol:      has no history on file for alcohol use. Drug Use:  has no history on file for drug use. Family History:     History reviewed. No pertinent family history.     Review of Systems:     ROS:  Constitutional  Negative for fever and chills    HEENT  Negative for ear discharge, ear pain, nosebleed    Eyes  Negative for photophobia, pain and discharge    Respiratory  Negative for hemoptysis and sputum    Cardiovascular  Negative for orthopnea, claudication and PND    Gastrointestinal  Negative for abdominal pain, diarrhea, blood in stool    Musculoskeletal  Negative for joint pain, negative for myalgia    Skin Negative for rash or itching    Neurological Negative for seizures, weakness, headache, dysarthria, aphasia   Endo/heme/allergies  Negative for polydipsia, environmental allergy    Psychiatric/behavioral  Negative for suicidal ideation. Patient is not anxious        Physical Exam:   BP (!) 165/58   Pulse 54   Temp 97.8 °F (36.6 °C) (Oral)   Resp 11   Ht 5' 6\" (1.676 m)   Wt 147 lb 14.4 oz (67.1 kg)   SpO2 96%   BMI 23.87 kg/m²   Temp (24hrs), Av.4 °F (36.9 °C), Min:97.8 °F (36.6 °C), Max:98.8 °F (37.1 °C)    Recent Labs     22  1602   POCGLU 109       Intake/Output Summary (Last 24 hours) at 2022 1005  Last data filed at 2022 0537  Gross per 24 hour   Intake --   Output 2050 ml   Net -2050 ml       General Appearance:  alert, well appearing, and in no acute distress  HEENT:  normocephalic, atraumatic. Lungs: Bilateral equal air entry, clear to ausculation, no wheezing, rales or rhonchi, normal effort  Cardiovascular: normal rate, regular rhythm, no murmur, gallop, rub. Abdomen: Soft, nontender, nondistended, normal bowel sounds, no hepatomegaly or splenomegaly    NEUROLOGIC EXAMINATION  MENTAL STATUS:  Alert, Oriented x 3 (Person, Place, Time),    Good Mood, Intact memory (Recent, Remote), No confusion,    Normal speech, Normal language (Spontaneous, Comprehension, Naming, Repetition, Reading, Writing)   No hallucination or delusion   Appropriate, Follows 1, 2, 3 step command, cross over   Normal Glabellar Response   CRANIAL NERVES: II     -      Visual fields intact to confrontation (blink to threat) and reporting fingers in all 4 quadrants on each eye, Negative visual extinction  Visual Acuity: no gross abnormalities. Fundoscopic Exam: Not tested  Pupillary Reflex (2 & 3):  PERRLA to direct and consensual response and accomodation (Near Response) bilaterally    III,IV,VI -  EOMs full intact, no ptosis, no diplopia, no nystagmus    V     -     Normal facial sensation bilaterally    VII -     Normal facial symmetry    VIII   -     Intact hearing bilaterally (Finger Rub, Rinne's and Claire Marisela)    IX,X -     Symmetrical palate    XI    -     Symmetrical shoulder shrug    XII   -     Midline tongue, no atrophy   MOTOR FUNCTION: Observation: No fasciculations, no atrophy, No tremors/involuntary movements in all 4 extremities proximally and distally    Passive Movement: Normal tone and bulk in all 4 extremities proximally and distally    Active Movement:  RUE: Significant for good strength of grade 5/5 in proximal muscle groups on abduction, adduction, flexion, extension, internal and external rotation and grade 5/5 on distal muscle groups on extension and flexion    LUE: Significant for good strength of grade 5/5 in proximal muscle groups on abduction, adduction, flexion, extension, internal and external rotation and grade 5/5 on distal muscle groups on extension and flexion    RLE: Significant for good strength of grade 5/5 in proximal muscle groups on abduction, adduction, flexion, extension, internal and external rotation and grade 5/5 on distal muscle groups on extension and flexion    LLE: Significant for  strength of grade 4/5 in proximal muscle groups on abduction, adduction, flexion, extension, internal and external rotation and grade 5/5 on distal muscle groups on extension and flexion     SENSORY FUNCTION:  RUE: Normal sensation to light touch, temperature, pin prick, vibration, proprioception (Joint position sense)     LUE: Normal sensation to light touch, temperature, pin prick, vibration, proprioception(Joint position sense)     RLE: Normal sensation to light touch, temperature, pin prick, vibration, proprioception(Joint position sense)     LLE:Normal sensation to light touch, temperature, pin prick, vibration, proprioception(Joint position sense)   CEREBELLAR FUNCTION:  Intact fine motor control over bilateral upper extremities and bilateral lower extremities (finger to nose, rapid alternating hand movement, finger tapping and heel to shin)   REFLEX FUNCTION:  Right Triceps (C7): 2/2                                             Left Tricep (C7): 2/2   Right Bicep (C5, C6): 2/2                                         Left Bicep (C5, C6): 2/2   Right Brachiocephalic (C6): 2/2                               Left Brachiocephalic (C6): 2/2     Right Patella (L4): 2/2                                              Left Patella (L4): 2/2   Right Achilles (S1): 2/2                                            Left Achilles (S1): 2/2      Plantar (Babinski) Response: unremarkable. Negative Kerning & Brudzinski   STATION and GAIT  Impaired gait      Investigations:      Laboratory Testing:  Recent Results (from the past 24 hour(s))   CBC    Collection Time: 09/02/22  3:30 AM   Result Value Ref Range    WBC 11.2 3.5 - 11.3 k/uL    RBC 4.90 4.21 - 5.77 m/uL    Hemoglobin 13.1 13.0 - 17.0 g/dL    Hematocrit 40.5 (L) 40.7 - 50.3 %    MCV 82.7 82.6 - 102.9 fL    MCH 26.7 25.2 - 33.5 pg    MCHC 32.3 28.4 - 34.8 g/dL    RDW 16.4 (H) 11.8 - 14.4 %    Platelets 380 463 - 235 k/uL    MPV 9.6 8.1 - 13.5 fL    NRBC Automated 0.0 0.0 per 100 WBC   Basic Metabolic Panel w/ Reflex to MG    Collection Time: 09/02/22  3:30 AM   Result Value Ref Range    Glucose 100 (H) 70 - 99 mg/dL    BUN 13 8 - 23 mg/dL    Creatinine 0.72 0.70 - 1.20 mg/dL    Calcium 8.4 (L) 8.6 - 10.4 mg/dL    Sodium 138 135 - 144 mmol/L    Potassium 4.1 3.7 - 5.3 mmol/L    Chloride 108 (H) 98 - 107 mmol/L    CO2 23 20 - 31 mmol/L    Anion Gap 7 (L) 9 - 17 mmol/L    GFR Non-African American >60 >60 mL/min    GFR African American >60 >60 mL/min    GFR Comment             Imaging/Diagnostics:    CT head   Impression   Age-indeterminate lacunar infarct within the right thalamus. Findings were discussed with Chante Thibodeaux MD at 4:41 pm on 8/31/2022.          CT head and neck W contrast      Impression   Occluded right vertebral artery with reconstitution of flow distally;   age-indeterminate. Status post right MCA aneurysm clipping, without evidence of aneurysmal   remnant. MRI brain  Pending    Assessment & Differential Dx:      Primary Problem  Stroke-like symptoms    Active Hospital Problems    Diagnosis Date Noted    Stroke-like symptoms [R29.90] 08/31/2022     Priority: Medium    Cerebrovascular accident (CVA) ([address] [I63.9] 08/31/2022     Priority: Medium    Received tissue plasminogen activator (t-PA) less than 24 hours prior to arrival [Z92.82] 08/31/2022     Priority: Medium    Acute left hemiparesis ([address] [G81.94] 08/31/2022     Priority: Medium    Nihss score 4 [R29.704] 08/31/2022     Priority: Medium       Plan:     Patient status Admit as inpatient in the  Progressive Unit/Step down    A 72/M with PMH of HTN, CAD, HLD, R MCA aneurysm (s/p clipping 2001), presented to outside facility with left sided numbness tingling and weakness. NIH 4 s/p tPA, admitted to neuro ICU for post tPA management. Acute stroke:  Likely subcortical stroke. -Initial NIH 4 s/p tPA infusion   -CT head: No acute intracranial abnormality except age-indeterminate lacunar infarct in right thalamus  -CTA H/N: Right vertebral artery occlusion with distal reconstitution, s/p right MCA aneurysm clipping. Evidence of encephalomalacia. Was loaded with Keppra for concern of subclinical seizure. Patient for seizure. We will follow-up with routine 30 minutes EEG as appropriate.   -Repeat CT head (post tPA follow-up) : No acute intracranial hemorrhage. Age-indeterminate, suspected remote lacunar infarct within right thalamus. -MRI brain- cant be done as aneurysmal clip is not surely MRI friendly or not. -ECHO pending.   -Lipid panel:chol 87, HDL 36 and LDL 38. Started on Lipitor 20 mg daily  -Hemoglobin A1c: 5.7  -PT/OT  -PM&R consulted, recommend admission to in patient rehab. HTN  Fairly controlled  On cozaar 50 mg and lasix 20 mg at home. Will resume cozaar today. Stable to be transferred to neurology floor. Will continue to monitor. Consultations:   IP CONSULT TO NEUROCRITICAL CARE  IP CONSULT TO PHARMACY  PHARMACY TO CHANGE BASE FLUIDS  IP CONSULT TO PHYSICAL MEDICINE REHAB      Patient is admitted as inpatient status because of co-morbidities listed above, severity of signs and symptoms as outlined, requirement for current medical therapies and most importantly because of direct risk to patient if care not provided in a hospital setting. Brigid Duarte MD  Internal Medicine Resident, PGY-2  Neurology Inpatient Service,   [facility];  [place], [place]  9/2/2022, 8:05 AM

## 2022-09-01 NOTE — PROGRESS NOTES
activities  Safety Devices  Type of Devices: Call light within reach, Chair alarm in place, Gait belt, Nurse notified, Left in chair  Restraints  Restraints Initially in Place: No     Restrictions  Restrictions/Precautions  Restrictions/Precautions: Fall Risk  Required Braces or Orthoses?: No  Position Activity Restriction  Other position/activity restrictions: SBP<180, s/p TPA administration 8/31/22     Subjective   General  Patient assessed for rehabilitation services?: Yes  Response To Previous Treatment: Not applicable  Family / Caregiver Present: Yes (sister and niece)  Follows Commands: Within Functional Limits  Subjective  Subjective: Pt supine in bed and agreeable to therapy this afternoon. Pt denies any pain. Pt eager to get out of bed and agreeable to up to chair. Social/Functional History  Social/Functional History  Lives With: Alone  Type of Home: House  Home Layout: Two level, Bed/Bath upstairs (Full bath on main level also. Full flight of stairs to bed/bath with right rail)  Home Access: Stairs to enter with rails  Entrance Stairs - Number of Steps: 5 steps in front with keira rails and 6 steps in back with left rail  Bathroom Shower/Tub: Walk-in shower, Tub/Shower unit (Walk-in shower upstairs which is what pt uses)  Bathroom Toilet: Standard  Bathroom Equipment: Shower chair (Pt states he puts shower chair outside of shower to put his socks on)  Home Equipment: Bishop Aguilar, rolling (Bed rail; use of cane outside of home)  Has the patient had two or more falls in the past year or any fall with injury in the past year?:  (Pt reports one recent fall forward while bending over to get ice)  Receives Help From:  (Pt reports multiple supportive family members who live close by.  Pt notes ability to have someone who could come stay with pt 24/7.)  ADL Assistance: [address]: Independent  Homemaking Responsibilities: Yes  Meal Prep Responsibility: Primary  Laundry Responsibility: Primary  Cleaning Responsibility: Primary  Shopping Responsibility: Primary  Ambulation Assistance: Independent  Transfer Assistance: Independent  Active : No  Mode of Transportation: Family  Occupation: Retired  Type of Occupation:   Leisure & Hobbies: Pt enjoys sewing  Vision/Hearing  Vision  Vision: Impaired  Vision Exceptions: Wears glasses at all times  Hearing  Hearing: Within functional limits    Cognition   Orientation  Overall Orientation Status: Within Functional Limits  Cognition  Overall Cognitive Status: WFL     Objective        Gross Assessment  Coordination: Generally decreased, functional (Left UE with finger to nose and Left LE heel to shin decreased but functional. Toe tapping equal bilaterally)  Sensation: Intact (Intact to light touch bilaterally- pt reports feeling equal)     AROM RLE (degrees)  RLE AROM: WFL  AROM LLE (degrees)  LLE AROM : WFL  AROM RUE (degrees)  RUE AROM : WFL  AROM LUE (degrees)  LUE AROM : WFL  Strength RLE  Strength RLE: WFL  R Hip Flexion: 4+/5  R Knee Flexion: 4+/5  R Knee Extension: 4+/5  R Ankle Dorsiflexion: 4+/5  R Ankle Plantar flexion: 4+/5  Strength LLE  Strength LLE: Exception  L Hip Flexion: 4-/5  L Knee Flexion: 4+/5  L Knee Extension: 4/5  L Ankle Dorsiflexion: 4+/5  L Ankle Plantar Flexion: 4/5  Strength RUE  Comment: Co evaluation with OT-see OT evaluation for full UE assessment  Strength LUE  Comment: Co evaluation with OT-see OT evaluation for full UE assessment; notable weakness but see OT note for specifics           Bed mobility  Rolling to Right: Stand by assistance  Supine to Sit: Minimal assistance (Increased time, use of bed rail (pt states he has a post at home and portable bed rail), HOB flat to mimic home setup, min A for only partial range of trunk progression otherwise CGA)  Sit to Supine:  (retired to chair at end of session)  Scooting: Contact guard assistance  Transfers  Sit to Stand: Contact guard assistance  Stand to sit: Contact guard assistance  Comment: cautious but without LOB  Ambulation  Surface: level tile  Device: Rolling Walker  Assistance: Contact guard assistance  Quality of Gait: decreased step length bilaterally, cautious gait, tends to watch his feet/steps- able to correct with cues  Gait Deviations: Slow Laura;Decreased step length;Shuffles  Distance: 15ft  More Ambulation?: Yes  Ambulation 2  Surface - 2: level tile  Device 2: No device  Assistance 2: Contact guard assistance  Quality of Gait 2: decreased step length bilaterally, cautious gait, tends to watch his feet/steps- able to correct with cues, no change in balance from use of RW  Gait Deviations: Slow Laura; Shuffles;Decreased step length;Decreased step height  Distance: 25ft  Ambulation 3  Surface - 3: level tile  Device 3:  (Pushing IV pole to mimic cane)  Assistance 3: Contact guard assistance  Quality of Gait 3: decreased step length bilaterally- slightly improved with cues and increased confidence, cautious gait, tends to watch his feet/steps- able to correct with cues  Gait Deviations: Slow Laura;Decreased step length;Decreased step height;Shuffles  Distance: 40ft  Stairs/Curb  Stairs?: No     Balance  Posture: Good  Sitting - Static: Good;-  Sitting - Dynamic: Fair;+  Standing - Static: Fair  Standing - Dynamic: Fair  Comments: standing balance assessed with RW, without device and with IV pole to mimic cane use           AM-PAC Score  -PAC Inpatient Mobility Raw Score : 19 (09/01/22 1429)  AM-PAC Inpatient T-Scale Score : 45.44 (09/01/22 1429)  Mobility Inpatient CMS 0-100% Score: 41.77 (09/01/22 1429)  Mobility Inpatient CMS G-Code Modifier : CK (09/01/22 1429)        Goals  Short Term Goals  Time Frame for Short term goals: 14 visits  Short term goal 1: Pt to ambulate 300ft modified independently with SPC  Short term goal 2: Pt to sit <> stand transfer independently  Short term goal 3: Pt to demonstrate independent bed mobility  Short term goal 4: Pt to ascend/descend flight of stairs with one rail modified independently to allow access to bed/bath level  Short term goal 5: Pt to demonstrate good standing balance to decrease risk of falls       Education  Patient Education  Education Given To: Patient; Family  Education Provided: Role of Therapy;Plan of Care;Transfer Training  Education Provided Comments: Educated on importance of mobility- assisted recommended at this time  Education Method: Verbal  Barriers to Learning: None  Education Outcome: Verbalized understanding      Therapy Time   Individual Concurrent Group Co-treatment   Time In 1335         Time Out 1409         Minutes 34         Timed Code Treatment Minutes: [address] [facility],

## 2022-09-01 NOTE — PROGRESS NOTES
Speech Language Pathology  Facility/Department: [address] NEURO ICU  Initial Speech/Language/Cognitive Assessment    NAME: Karina Godinez  : 1950   MRN: [id number]  ADMISSION DATE: 2022  ADMITTING DIAGNOSIS: has Stroke-like symptoms; Cerebrovascular accident (CVA) ([facility] Received tissue plasminogen activator (t-PA) less than 24 hours prior to arrival; Acute left hemiparesis ([facility] and Nihss score 4 on their problem list.    Date of Eval: 2022   Evaluating Therapist: Husam Hernandez    RECENT RESULTS  CT OF HEAD/MRI:   Age-indeterminate lacunar infarct within the right thalamus. Findings were discussed with Jaden Hung MD at 4:41 pm on 2022. Primary Complaint: The patient is a 74 y.o. male with a history of HTN, HLD, CAD and R MCA aneurysm clipping () who presented as a transfer from [facility] s/p TPA administration for acute stroke like symptoms. Initially presented to [facility] with acute onset left sided weakness and numbness/tingling. LKW around 1000AM.  Patient states he woke up this morning feeling like his normal self. Around 1000AM when he was eating breakfast his left arm and leg start to feel numb and tingly. He called EMS and was transported to [facility].  NIH at [facility] ED 4. 's. CT Head negative. TPA administered, infusion finished at 1526. Transferred to [facility] for post TPA management. Stroke alert called on arrival. NIH 4.  CT Head showed age indeterminate right thalamic infarct. CTA Head/Neck showed right vert occlusion, right MCA aneurysm clipping. Patient is on Aspirin daily. Loaded with Keppra 1g. Of note, patient was seen last weekend at an [facility] ED after having a mechanical fall at home. Patient lives at home alone. He is independent in ADLs. He states he started using a cane recently due to fear of falling. His life partner passed away several years ago. He does not have children. His parents are .   He has siblings and neices and nephews in the area. Neuro Critical Care consulted for admission. On exam in the ED, NIH 4. Patient Aox3 and following commands. No obvious facial droop. Mild dysarthria, possibly secondary to patient not having his dentures. Mild left upper and lower extremity drift, limbs do not hit the bed. Decreased sensation in the left upper and lower extremities. Denies chest pain, shortness of breath, dizziness, headache, pain. -170's. Admitted to the Neuro ICU for close monitoring. Pain:  Pain Assessment  Pain Assessment: None - Denies Pain    Vision/ Hearing  Vision  Vision: Within Functional Limits  Hearing  Hearing: Within functional limits    Assessment:    Pt presents with mild cognitive deficits characterized by difficulty with short term memory. Pt reports he is functioning at his baseline at this time. Pt with no dysarthria, no O/M weakness noted. No further ST recommended at this time.         Recommendations:  Recommendations  Requires SLP Intervention: No  Patient Education: yes  Patient Education Response: Verbalizes understanding             Plan:   Speech Therapy Prognosis  Prognosis: Good  Individuals consulted  Consulted and agree with results and recommendations: Patient    Goals:      Patient/family involved in developing goals and treatment plan: yes    Subjective:   Previous level of function and limitations: independent     Social/Functional History  Lives With: Alone  Type of Home: House  Vision  Vision: Within Functional Limits  Hearing  Hearing: Within functional limits           Objective:       Oral Motor   Labial: No impairment  Lingual: No impairment    Motor Speech  Apraxic Characteristics: None  Intelligibility: No impairment  Overall Impairment Severity: None    Cognition:      Orientation  Overall Orientation Status: Within Normal Limits  Attention  Attention: Within Functional Limits  Memory  Memory: Exceptions to [facility] (Immediate recall 3/3, 3/5)  Short-term Memory: Mild (3/3, 2/3)  Problem Solving  Problem Solving: Within Functional Limits  Abstract Reasoning  Abstract Reasoning: Within Functional Limits  Safety/Judgment  Safety/Judgment: Within Functional Limits       Prognosis:  Speech Therapy Prognosis  Prognosis: Good  Individuals consulted  Consulted and agree with results and recommendations: Patient    Education:  Patient Education: yes  Patient Education Response: Verbalizes understanding          Therapy Time:   Individual Concurrent Group Co-treatment   Time In  900         Time Out  915         Minutes  15               Evaluation completed by   Electronically signed by Lucy Pena on 9/1/2022 at 12:55 PM  Cosigned by Robson Quezada

## 2022-09-02 VITALS
HEART RATE: 66 BPM | OXYGEN SATURATION: 96 % | RESPIRATION RATE: 17 BRPM | BODY MASS INDEX: 23.77 KG/M2 | TEMPERATURE: 98.1 F | HEIGHT: 66 IN | SYSTOLIC BLOOD PRESSURE: 175 MMHG | DIASTOLIC BLOOD PRESSURE: 47 MMHG | WEIGHT: 147.9 LBS

## 2022-09-02 PROBLEM — Z98.890 HISTORY OF CEREBRAL ANEURYSM REPAIR: Status: ACTIVE | Noted: 2022-09-02

## 2022-09-02 PROBLEM — I63.411 CEREBRAL INFARCTION DUE TO EMBOLISM OF RIGHT MIDDLE CEREBRAL ARTERY (HCC): Status: ACTIVE | Noted: 2022-09-02

## 2022-09-02 PROBLEM — Z86.79 HISTORY OF CEREBRAL ANEURYSM REPAIR: Status: ACTIVE | Noted: 2022-09-02

## 2022-09-02 LAB
ANION GAP SERPL CALCULATED.3IONS-SCNC: 7 MMOL/L (ref 9–17)
BUN BLDV-MCNC: 13 MG/DL (ref 8–23)
CALCIUM SERPL-MCNC: 8.4 MG/DL (ref 8.6–10.4)
CHLORIDE BLD-SCNC: 108 MMOL/L (ref 98–107)
CO2: 23 MMOL/L (ref 20–31)
CREAT SERPL-MCNC: 0.72 MG/DL (ref 0.7–1.2)
GFR AFRICAN AMERICAN: >60 ML/MIN
GFR NON-AFRICAN AMERICAN: >60 ML/MIN
GFR SERPL CREATININE-BSD FRML MDRD: ABNORMAL ML/MIN/{1.73_M2}
GLUCOSE BLD-MCNC: 100 MG/DL (ref 70–99)
HCT VFR BLD CALC: 40.5 % (ref 40.7–50.3)
HEMOGLOBIN: 13.1 G/DL (ref 13–17)
LV EF: 50 %
LVEF MODALITY: NORMAL
MCH RBC QN AUTO: 26.7 PG (ref 25.2–33.5)
MCHC RBC AUTO-ENTMCNC: 32.3 G/DL (ref 28.4–34.8)
MCV RBC AUTO: 82.7 FL (ref 82.6–102.9)
NRBC AUTOMATED: 0 PER 100 WBC
PDW BLD-RTO: 16.4 % (ref 11.8–14.4)
PLATELET # BLD: 162 K/UL (ref 138–453)
PMV BLD AUTO: 9.6 FL (ref 8.1–13.5)
POTASSIUM SERPL-SCNC: 4.1 MMOL/L (ref 3.7–5.3)
RBC # BLD: 4.9 M/UL (ref 4.21–5.77)
SODIUM BLD-SCNC: 138 MMOL/L (ref 135–144)
WBC # BLD: 11.2 K/UL (ref 3.5–11.3)

## 2022-09-02 PROCEDURE — 6370000000 HC RX 637 (ALT 250 FOR IP): Performed by: NURSE PRACTITIONER

## 2022-09-02 PROCEDURE — 99223 1ST HOSP IP/OBS HIGH 75: CPT | Performed by: PSYCHIATRY & NEUROLOGY

## 2022-09-02 PROCEDURE — 6360000002 HC RX W HCPCS: Performed by: NURSE PRACTITIONER

## 2022-09-02 PROCEDURE — 95816 EEG AWAKE AND DROWSY: CPT

## 2022-09-02 PROCEDURE — 80048 BASIC METABOLIC PNL TOTAL CA: CPT

## 2022-09-02 PROCEDURE — 85027 COMPLETE CBC AUTOMATED: CPT

## 2022-09-02 PROCEDURE — 6370000000 HC RX 637 (ALT 250 FOR IP)

## 2022-09-02 PROCEDURE — 97116 GAIT TRAINING THERAPY: CPT

## 2022-09-02 PROCEDURE — 93306 TTE W/DOPPLER COMPLETE: CPT

## 2022-09-02 PROCEDURE — 6370000000 HC RX 637 (ALT 250 FOR IP): Performed by: STUDENT IN AN ORGANIZED HEALTH CARE EDUCATION/TRAINING PROGRAM

## 2022-09-02 PROCEDURE — 95816 EEG AWAKE AND DROWSY: CPT | Performed by: PSYCHIATRY & NEUROLOGY

## 2022-09-02 PROCEDURE — 97110 THERAPEUTIC EXERCISES: CPT

## 2022-09-02 PROCEDURE — 36415 COLL VENOUS BLD VENIPUNCTURE: CPT

## 2022-09-02 PROCEDURE — 94761 N-INVAS EAR/PLS OXIMETRY MLT: CPT

## 2022-09-02 RX ORDER — CLOPIDOGREL BISULFATE 75 MG/1
75 TABLET ORAL DAILY
Qty: 20 TABLET | Refills: 0 | Status: SHIPPED | OUTPATIENT
Start: 2022-09-03 | End: 2022-09-29

## 2022-09-02 RX ORDER — LOSARTAN POTASSIUM 50 MG/1
50 TABLET ORAL DAILY
Status: DISCONTINUED | OUTPATIENT
Start: 2022-09-02 | End: 2022-09-02

## 2022-09-02 RX ORDER — CLOPIDOGREL BISULFATE 75 MG/1
75 TABLET ORAL DAILY
Status: DISCONTINUED | OUTPATIENT
Start: 2022-09-02 | End: 2022-09-02 | Stop reason: HOSPADM

## 2022-09-02 RX ORDER — CLOPIDOGREL BISULFATE 75 MG/1
75 TABLET ORAL DAILY
Qty: 20 TABLET | Refills: 0 | Status: SHIPPED | OUTPATIENT
Start: 2022-09-03 | End: 2022-09-02 | Stop reason: SDUPTHER

## 2022-09-02 RX ORDER — LOSARTAN POTASSIUM 25 MG/1
25 TABLET ORAL DAILY
Status: DISCONTINUED | OUTPATIENT
Start: 2022-09-03 | End: 2022-09-02 | Stop reason: HOSPADM

## 2022-09-02 RX ORDER — ATORVASTATIN CALCIUM 20 MG/1
20 TABLET, FILM COATED ORAL NIGHTLY
Status: DISCONTINUED | OUTPATIENT
Start: 2022-09-02 | End: 2022-09-02 | Stop reason: HOSPADM

## 2022-09-02 RX ADMIN — LOSARTAN POTASSIUM 50 MG: 50 TABLET, FILM COATED ORAL at 10:27

## 2022-09-02 RX ADMIN — CLOPIDOGREL 75 MG: 75 TABLET, FILM COATED ORAL at 12:39

## 2022-09-02 RX ADMIN — PANTOPRAZOLE SODIUM 40 MG: 40 TABLET, DELAYED RELEASE ORAL at 06:21

## 2022-09-02 RX ADMIN — ENOXAPARIN SODIUM 40 MG: 100 INJECTION SUBCUTANEOUS at 08:23

## 2022-09-02 RX ADMIN — ASPIRIN 81 MG: 81 TABLET, CHEWABLE ORAL at 08:23

## 2022-09-02 ASSESSMENT — PAIN SCALES - GENERAL
PAINLEVEL_OUTOF10: 0
PAINLEVEL_OUTOF10: 0

## 2022-09-02 NOTE — PROGRESS NOTES
Physician Progress Note      Billy De La Cruz  CSN #:                  [id number]  :                       1950  ADMIT DATE:       2022 3:42 PM  [address] [place] DATE:  RESPONDING  PROVIDER #:        Elvia Duggan MD          QUERY TEXT:    Pt admitted with acute onset of stroke like symptoms with left   hemiplegia/hemiparesis. Pt noted to have received TPA at OSH  with imaging showing age indeterminate infarct in right thalamus. If   possible, please document in progress notes and discharge summary the   relationship, if any, between symptoms and possible CVA. The medical record reflects the following:  Risk Factors: HTN, HLD, PREVIOUS CVA  Clinical Indicators: Left hemiparesis/hemiplegia, CTA head- showed age   indeterminate right thalamic infarct. CTA Head/Neck showed right vert   occlusion, right MCA aneurysm clipping. NIH- 4. Treatment: IV TPA at [facility] before transfer,  PO- ASA, Loaded with   Keppra. Thank you, please contact me for any questions. Nicol Gilliam RN, CDS, cell- [phone],  office [id number]  Options provided:  -- CVA aborted due to IV TPA  -- CVA due to right thalamic infarct  -- CVA due to right thalamic infarct and IV TPA  -- CVA sequela of previous R MCA CVA  -- Other - I will add my own diagnosis  -- Disagree - Not applicable / Not valid  -- Disagree - Clinically unable to determine / Unknown  -- Refer to Clinical Documentation Reviewer    PROVIDER RESPONSE TEXT:    This patient has CVA , aborted due to IV TPA.     Query created by: Kev Harrell on 2022 11:30 AM      Electronically signed by:  Elvia Duggan MD 2022 10:01 PM

## 2022-09-02 NOTE — PROCEDURES
Date: 9/2/2022  Referring physician: Isis Hawley MD    Indication  Patient aged 74 y with encephalopathy. EEG done to assess for epileptiform activity. Introduction  This routine 26-minute EEG was recorded using the International 10-20 System on a [employer] workstation at 256 samples/s. Automated spike and seizure detection algorithms were applied. Description  During the maximal alert state, a well-regulated, symmetric, and reactive 9 Hz posterior dominant rhythm was seen. No consistent focal slowing or interhemispheric asymmetry was noted. Stage I and stage II sleep were not observed. There were no interictal epileptiform discharges or electrographic seizures. Activations  Hyperventilation was not performed. Intermittent photic stimulation was performed and demonstrated no posterior driving response. Impression  Normal awake  EEG. EKG lead did not show clear arrhythmia, if still in concern consider formal EKG or correlation with telemetry. No epileptiform discharges were identified. Please note the absence of such activity on this record cannot conclusively rule out an epileptic disorder. If such is still clinically suspected, a repeat study with sleep deprivation and/or prolonged sampling may be helpful. Barrera Tom MD  Epilepsy Board Certified. Neurology Board Certified.     Electronically Signed

## 2022-09-02 NOTE — DISCHARGE INSTRUCTIONS
Learning About Thrombolytic Treatment for Stroke  What is thrombolytic treatment? Thrombolytics are medicines that dissolve blood clots. Most strokes are caused by a blood clot. This kind of stroke is called an ischemic (say \"cpg-JPO-[id number]\") stroke. For an ischemic stroke, this medicine can dissolve the clot quickly and help blood to flow in a normal way again. This can help limit damage to thebrain. The medicine is given through a vein in your hand or arm. It travels throughthe bloodstream to the clot. Doctors try to give the medicine within 4½ hours after stroke symptoms start. An example of this medicine is called TPA (tissue plasminogen activator). How does thrombolytic treatment help during a stroke? When a blood clot moves to a blood vessel in the brain, it blocks blood flow to that area and causes a stroke. Without blood and the oxygen it carries, that part of the brain starts to die. If blood supply isn't restored, permanent damage usually occurs. The body parts controlled by those damaged cells can't work, or function, as they should. This loss of function may be mild or severe. It may be short-term or lifelong. Thrombolytic treatment can improve recovery from a stroke. Doctors try to give it as soon as possible after the stroke happens. It can limit brain damage from a stroke by dissolving the blood clot. Without medicine to dissolve it, a bloodclot in your brain is more likely to cause serious brain damage. In general, the less damage there is to the brain tissue, the less disability a stroke causes. And with less damage to brain tissue, you are more likely torecover from the stroke. No treatment can guarantee a full recovery from a stroke. But this medicinedoes improve your chances of having less or no disability after a stroke. What are the risks of thrombolytic treatment? The main risk of thrombolytic treatment is that it can cause serious bleeding in the body.  If bleeding happens in the brain, it can cause worse strokesymptoms or even death. Doctors are very careful about using this medicine. For example, a brain scan is done before treatment to make sure you have no signs of bleeding. (Thrombolytic treatment would make any bleeding worse.) And after treatment, you are closely watched for some time to make sure you have no internalbleeding. Is there another treatment choice? When your doctor tells you that thrombolytic treatment is a choice for you, it's because he or she believes that it offers you the best chance of recovery from stroke. But because this medicine also has risks, it is up to you tochoose it or not. Based on your condition, your doctor will explain what other choices you have for treatment. They may include other medicines that stop the clot from gettingbigger. With or without thrombolytic treatment, you will have care to help you getbetter. And you'll have medicine to prevent blood clots and control symptoms. Having a stroke can make it hard to make quick and complex decisions. Feeling afraid or anxious can make it even harder to think clearly. Your hospital staff understands this. They will explain your choices and answer your questions. Andthey will help you decide about your treatment. Follow-up care is a key part of your treatment and safety. Be sure to make and go to all appointments, and call your doctor if you are having problems. It's also a good idea to know your test results and keep alist of the medicines you take. Where can you learn more? Go to https://[email or web address]. org and sign in to your [employer] account. Enter X272 in the [facility] box to learn more about \"Learning About Thrombolytic Treatment for Stroke. \"     If you do not have an account, please click on the \"Sign Up Now\" link. Current as of: January 10, 2022               Content Version: 13.3  © 5343-1156 Healthwise, Incorporated.    Care instructions adapted under license by [facility] ([facility]). If you have questions about a medical condition or this instruction, always ask your healthcare professional. [place] [address] any warranty or liability for your use of this information.

## 2022-09-02 NOTE — PROGRESS NOTES
Physical Therapy  Facility/Department: [address] NEURO ICU  Physical Therapy Daily Treatment Note    Name: Demetria Woodard  : 1950  MRN: [id number]  Date of Service: 2022    Discharge Recommendations:  Patient would benefit from continued therapy after discharge   PT Equipment Recommendations  Equipment Needed: No  Other: Pt owns a SPC and RW. Patient Diagnosis(es): The encounter diagnosis was Cerebrovascular accident (CVA), unspecified mechanism ([address] Past Medical History:  has no past medical history on file. Past Surgical History:  has a past surgical history that includes Brain aneurysm surgery. Assessment   Body Structures, Functions, Activity Limitations Requiring Skilled Therapeutic Intervention: Decreased functional mobility ; Decreased strength;Decreased balance  Assessment: Pt ambulated 250ft with SPC and CGA for safety, also performed stairs with CGA demo good performance and sequencing. Pt should be safe to return prior living arrangements with family support. Recommending continued PT to address further strength and balance deficits and maximize safety and independence with mobility.   Therapy Prognosis: Good  Requires PT Follow-Up: Yes  Activity Tolerance  Activity Tolerance: Patient tolerated treatment well  Activity Tolerance Comments: Pleasant, cooperative and motivated throughout session     Plan   Plan  Plan:  (5-6x/week)  Current Treatment Recommendations: Strengthening, ROM, Balance training, Functional mobility training, Transfer training, Endurance training, Gait training, Stair training, Neuromuscular re-education, Home exercise program, Safety education & training, Patient/Caregiver education & training, Therapeutic activities  Safety Devices  Type of Devices: Call light within reach, Chair alarm in place, Gait belt, Left in chair  Restraints  Restraints Initially in Place: No     Restrictions  Restrictions/Precautions  Restrictions/Precautions: Fall Risk  Required Braces or Orthoses?: No  Position Activity Restriction  Other position/activity restrictions: SBP<180, s/p TPA administration 8/31/22     Subjective   General  Chart Reviewed: Yes  Patient assessed for rehabilitation services?: Yes  Response To Previous Treatment: Patient with no complaints from previous session. Family / Caregiver Present: No  Follows Commands: Within Functional Limits  General Comment  Comments: Pt returned to chair at end of session with call light in reach. Subjective  Subjective: Pt and RN agreeable to PT this afternoon. Pt seated in chair upon arrival with no c/o pain. Pt very pleasant and cooperative throughout session. Cognition   Orientation  Overall Orientation Status: Within Functional Limits  Cognition  Overall Cognitive Status: WFL     Objective   Bed mobility  Supine to Sit: Unable to assess  Sit to Supine: Unable to assess  Scooting: Contact guard assistance  Bed Mobility Comments: Pt up in chair upon entry/exit this date. Transfers  Sit to Stand: Contact guard assistance  Stand to sit: Contact guard assistance  Comment: SPC used for transfers, good hand placement throughout. Ambulation  Surface: level tile  Device: Single point cane  Assistance: Contact guard assistance  Quality of Gait: decreased step length bilaterally, cautious gait, tends to watch his feet/steps- able to correct with cues  Gait Deviations: Slow Laura;Decreased step length  Distance: 250ft  Comments: slow and steady, step-to gait pattern able to correct with cues. Stairs/Curb  Stairs?: Yes  Stairs  # Steps : 10  Stairs Height: 6\"  Rails: Left ascending  Device: Single pt cane  Assistance: Contact guard assistance (for safety)  Comment: Pt educated on correct technique and performance of stairs, performed well with CGA. Balance  Posture: Good  Sitting - Static: Good;-  Sitting - Dynamic: Fair;+  Standing - Static: Fair  Standing - Dynamic: Fair  Comments: standing balance assessed with SPC.     Exercise Treatment:  Seated LE exercise program: Long Arc Quads, hip abduction/adduction, heel/toe raises, and marches. Reps: x10 BLE  Comments: Pt performed while seated in chair.       AM-PAC Score  AM-PAC Inpatient Mobility Raw Score : 19 (09/02/22 1336)  AM-PAC Inpatient T-Scale Score : 45.44 (09/02/22 1336)  Mobility Inpatient CMS 0-100% Score: 41.77 (09/02/22 1336)  Mobility Inpatient CMS G-Code Modifier : CK (09/02/22 1336)      Goals  Short Term Goals  Time Frame for Short term goals: 14 visits  Short term goal 1: Pt to ambulate 300ft modified independently with SPC  Short term goal 2: Pt to sit <> stand transfer independently  Short term goal 3: Pt to demonstrate independent bed mobility  Short term goal 4: Pt to ascend/descend flight of stairs with one rail modified independently to allow access to bed/bath level  Short term goal 5: Pt to demonstrate good standing balance to decrease risk of falls       Education  Patient Education  Education Given To: Patient  Education Provided: Role of Therapy;Plan of Care;Transfer Training  Education Method: Verbal  Barriers to Learning: None  Education Outcome: Verbalized understanding      Therapy Time   Individual Concurrent Group Co-treatment   Time In 1301         Time Out 1325         Minutes 24         Timed Code Treatment Minutes: 24 Minutes       Augustin Darling PTA

## 2022-09-02 NOTE — CARE COORDINATION
Transitional Plannin: Patient has a PCP and wants therapy in the home. Referral faxed to [address]

## 2022-09-02 NOTE — PLAN OF CARE
Problem: Discharge Planning  Goal: Discharge to home or other facility with appropriate resources  9/2/2022 0850 by Frankie Silva RN  Outcome: Progressing  9/2/2022 0126 by Flora Dias  Outcome: Progressing     Problem: Safety - Adult  Goal: Free from fall injury  9/2/2022 0850 by Frankie Silva RN  Outcome: Progressing  9/2/2022 0126 by Flora Dias  Outcome: Progressing

## 2022-09-06 LAB
EKG ATRIAL RATE: 63 BPM
EKG P AXIS: 33 DEGREES
EKG P-R INTERVAL: 124 MS
EKG Q-T INTERVAL: 482 MS
EKG QRS DURATION: 154 MS
EKG QTC CALCULATION (BAZETT): 493 MS
EKG R AXIS: 17 DEGREES
EKG T AXIS: 123 DEGREES
EKG VENTRICULAR RATE: 63 BPM

## 2022-09-08 NOTE — DISCHARGE SUMMARY
[address] [place] [address]     Department of Neurology    INPATIENT DISCHARGE SUMMARY        Patient Identification:  Karina Godinez is a 74 y.o. male. :  1950  MRN: [id number]     Acct: [de-identified]   Admit Date:  2022  Discharge date and time: 2022  5:47 PM   Attending Provider: Dr Jeancarlos Maria     Admission Diagnoses:   Stroke-like symptoms [R29.90]  Cerebrovascular accident (CVA), unspecified mechanism ([facility] [I63.9]    Discharge Diagnoses:   Principal Problem:    Stroke-like symptoms  Active Problems:    Cerebrovascular accident (CVA) ([facility]    Received tissue plasminogen activator (t-PA) less than 24 hours prior to arrival    Acute left hemiparesis (HCC)    Nihss score 4    Cerebral infarction due to embolism of right middle cerebral artery ([facility]    History of cerebral aneurysm repair  Resolved Problems:    * No resolved hospital problems. *       Consults:   rehabilitation medicine    Brief Inpatient course:    A 72/M WITH PMH of R MCA aneurysm (s/p clipping ), presented to [facility] with left-sided numbness, tingling and weakness NIH was 4. CT Head ruled out acute intracranial abnormalities, except age-indeterminate lacunar infarct within the right thalamus. He received IV tPA. CTA H/N showed right vertebral artery occlusion with distal reconstitution, s/p right MCA aneurysm clip with surrounding encephalomalacia. He was loaded with 1 g Keppra as he was having waxing and waning mentation and concern for subclinical seizures. He was transferred to [address] [place] [address] and admitted to neuro ICU for post TPA management. Passed bedside swallow evaluation. Has improving left sided weakness. He is working with PT/OT, has impaired gait. PM&R consulted for discharge recommendations, recommend inpatient admission to rehab facility. Was transferred to neurology floor.   Follow-up head CT 24 hours of tPA showed age-indeterminate but suspected remote lacunar infarct within the right thalamus. No acute ICH identified. Sequela of right MCA aneurysm clipping. Patient was able to ambulate about 250 feet. Decision was made to return to home and to continue physical therapy at home. He remained hemodynamically stable, his medications were optimized and was discharged with instructions as given below. Procedures:  IV tPA    Any Hospital Acquired Infections: none    Discharge Functional Status:  stable    Disposition: home    Patient Instructions:   Please follow-up with your primary care provider as soon as possible. Continue to take all your medications as prescribed. Continue aspirin for lifelong and take Plavix for 21 days.     Discharge Medications:  Discharge Medication List as of 9/2/2022  5:13 PM        START taking these medications    Details   clopidogrel (PLAVIX) 75 MG tablet Take 1 tablet by mouth daily for 20 doses, Disp-20 tablet, R-0Normal           CONTINUE these medications which have NOT CHANGED    Details   losartan (COZAAR) 50 MG tablet Take 50 mg by mouth dailyHistorical Med      omeprazole (PRILOSEC) 40 MG delayed release capsule Take 40 mg by mouth dailyHistorical Med      rosuvastatin (CRESTOR) 10 MG tablet Take 10 mg by mouth dailyHistorical Med      vitamin D3 (CHOLECALCIFEROL) 25 MCG (1000 UT) TABS tablet Take 1,000 Units by mouth dailyHistorical Med      B Complex Vitamins (B COMPLEX 1 PO) Take 1 tablet by mouth dailyHistorical Med      aspirin 81 MG EC tablet Take 81 mg by mouth dailyHistorical Med      cyclobenzaprine (FLEXERIL) 10 MG tablet Take 10 mg by mouth daily as needed for Muscle spasmsHistorical Med      furosemide (LASIX) 20 MG tablet Take 20 mg by mouth every other dayHistorical Med             Activity: activity as tolerated    Restrictions: Driving Yes, Swimming Yes, Operating heavy machinery Yes, Compromising heights Yes    Diet: regular diet    Follow-up:    Emery Acosta [address]  [phone]    Call in 1 week(s)  post hospitalization follow up    [facility] NEURO HOSPITAL[facility]  [address]  [place] [id number]  Call in 1 week(s)  post stroke follow up.       Follow up labs: none       Follow up imaging: none     Note that over 30 minutes was spent in preparing discharge papers, discussing discharge with patient, medication review, etc.      Malgorzata Rizzo MD,  Department of Neurology  [address]  9/8/2022, 5:34 PM

## 2022-09-16 ENCOUNTER — OFFICE VISIT (OUTPATIENT)
Dept: INTERNAL MEDICINE | Age: 72
End: 2022-09-16
Payer: MEDICARE

## 2022-09-16 VITALS
OXYGEN SATURATION: 99 % | WEIGHT: 151 LBS | BODY MASS INDEX: 24.27 KG/M2 | HEART RATE: 86 BPM | TEMPERATURE: 97.3 F | HEIGHT: 66 IN | DIASTOLIC BLOOD PRESSURE: 69 MMHG | SYSTOLIC BLOOD PRESSURE: 131 MMHG

## 2022-09-16 DIAGNOSIS — Z00.00 HEALTH CARE MAINTENANCE: ICD-10-CM

## 2022-09-16 DIAGNOSIS — Z87.891 FORMER SMOKER: ICD-10-CM

## 2022-09-16 DIAGNOSIS — Z86.79 HISTORY OF CEREBRAL ANEURYSM REPAIR: ICD-10-CM

## 2022-09-16 DIAGNOSIS — Z98.890 HISTORY OF CEREBRAL ANEURYSM REPAIR: ICD-10-CM

## 2022-09-16 DIAGNOSIS — I63.30 CEREBROVASCULAR ACCIDENT (CVA) DUE TO THROMBOSIS OF CEREBRAL ARTERY (HCC): Primary | ICD-10-CM

## 2022-09-16 DIAGNOSIS — I10 WELL-CONTROLLED HYPERTENSION: ICD-10-CM

## 2022-09-16 DIAGNOSIS — Z09 HOSPITAL DISCHARGE FOLLOW-UP: ICD-10-CM

## 2022-09-16 PROCEDURE — 1123F ACP DISCUSS/DSCN MKR DOCD: CPT | Performed by: STUDENT IN AN ORGANIZED HEALTH CARE EDUCATION/TRAINING PROGRAM

## 2022-09-16 PROCEDURE — 1111F DSCHRG MED/CURRENT MED MERGE: CPT | Performed by: STUDENT IN AN ORGANIZED HEALTH CARE EDUCATION/TRAINING PROGRAM

## 2022-09-16 PROCEDURE — 99203 OFFICE O/P NEW LOW 30 MIN: CPT | Performed by: STUDENT IN AN ORGANIZED HEALTH CARE EDUCATION/TRAINING PROGRAM

## 2022-09-16 PROCEDURE — 99211 OFF/OP EST MAY X REQ PHY/QHP: CPT | Performed by: STUDENT IN AN ORGANIZED HEALTH CARE EDUCATION/TRAINING PROGRAM

## 2022-09-16 RX ORDER — ROSUVASTATIN CALCIUM 20 MG/1
20 TABLET, COATED ORAL NIGHTLY
Status: SHIPPED | COMMUNITY
Start: 2022-09-16

## 2022-09-16 SDOH — ECONOMIC STABILITY: TRANSPORTATION INSECURITY
IN THE PAST 12 MONTHS, HAS THE LACK OF TRANSPORTATION KEPT YOU FROM MEDICAL APPOINTMENTS OR FROM GETTING MEDICATIONS?: NO

## 2022-09-16 SDOH — ECONOMIC STABILITY: FOOD INSECURITY: WITHIN THE PAST 12 MONTHS, YOU WORRIED THAT YOUR FOOD WOULD RUN OUT BEFORE YOU GOT MONEY TO BUY MORE.: NEVER TRUE

## 2022-09-16 SDOH — ECONOMIC STABILITY: FOOD INSECURITY: WITHIN THE PAST 12 MONTHS, THE FOOD YOU BOUGHT JUST DIDN'T LAST AND YOU DIDN'T HAVE MONEY TO GET MORE.: NEVER TRUE

## 2022-09-16 SDOH — ECONOMIC STABILITY: TRANSPORTATION INSECURITY
IN THE PAST 12 MONTHS, HAS LACK OF TRANSPORTATION KEPT YOU FROM MEETINGS, WORK, OR FROM GETTING THINGS NEEDED FOR DAILY LIVING?: NO

## 2022-09-16 ASSESSMENT — PATIENT HEALTH QUESTIONNAIRE - PHQ9
SUM OF ALL RESPONSES TO PHQ QUESTIONS 1-9: 0
2. FEELING DOWN, DEPRESSED OR HOPELESS: 0
1. LITTLE INTEREST OR PLEASURE IN DOING THINGS: 0
SUM OF ALL RESPONSES TO PHQ9 QUESTIONS 1 & 2: 0
SUM OF ALL RESPONSES TO PHQ QUESTIONS 1-9: 0

## 2022-09-16 ASSESSMENT — SOCIAL DETERMINANTS OF HEALTH (SDOH): HOW HARD IS IT FOR YOU TO PAY FOR THE VERY BASICS LIKE FOOD, HOUSING, MEDICAL CARE, AND HEATING?: NOT HARD AT ALL

## 2022-09-16 NOTE — PROGRESS NOTES
Post-Discharge Transitional Care  Follow Up      Edelmira Adi   YOB: 1950    Date of Office Visit:  9/16/2022  Date of Hospital Admission: 8/31/22  Date of Hospital Discharge: 9/2/22  Risk of hospital readmission (high >=14%. Medium >=10%) :Readmission Risk Score: 13.2      Care management risk score Rising risk (score 2-5) and Complex Care (Scores >=6): No Risk Score On File     Non face to face  following discharge, date last encounter closed (first attempt may have been earlier): *No documented post hospital discharge outreach found in the last 14 days    Call initiated 2 business days of discharge: *No response recorded in the last 14 days    ASSESSMENT/PLAN:   Cerebrovascular accident (CVA) due to thrombosis of cerebral artery ([facility]  Former smoker  -     [place] AAA; Future  Health care maintenance  -     Hepatitis C Antibody; Future  Hospital discharge follow-up  -     [place] DISCHARGE MEDS RECONCILED W/ CURRENT OUTPATIENT MED LIST  Well-controlled hypertension  History of cerebral aneurysm repair      Medical Decision Making: moderate complexity and high complexity    On this date 9/16/2022 I have spent 40 minutes reviewing previous notes, test results and face to face with the patient discussing the diagnosis and importance of compliance with the treatment plan as well as documenting on the day of the visit. Subjective:   HPI:  Follow up of Hospital problems/diagnosis(es):   78-year-old with a past medical history of right MCA aneurysm s/p clipping in 2001 for symptoms of left-sided numbness, tingling and weakness. Patient was given tPA after CT scan done and was ruled out for acute intracranial abnormality. CTA showed right vertebral artery occlusion with distal reconstitution s/p right MCA aneurysm clip with surrounding encephalomalacia. Patient was transferred to [place] and admitted to neuro ICU. His left-sided weakness improved after PT OT.   Patient was discharged home with home PT OT. Inpatient course: Discharge summary reviewed- see chart. Interval history/Current status: Patient is doing physical therapy and is doing really well  Per patient last physical therapy session was yesterday  he is using cane to walk around  Has a follow-up appointment with cardiology next week and neurology in October      Patient Active Problem List   Diagnosis    Stroke-like symptoms    Cerebrovascular accident (CVA) ([facility]    Received tissue plasminogen activator (t-PA) less than 24 hours prior to arrival    Acute left hemiparesis (HCC)    Nihss score 4    Cerebral infarction due to embolism of right middle cerebral artery ([facility] [address] [facility]    History of cerebral aneurysm repair       Medications listed as ordered at the time of discharge from hospital     Medication List            Accurate as of September 16, 2022  4:06 PM. If you have any questions, ask your nurse or doctor.                 CHANGE how you take these medications      rosuvastatin 20 MG tablet  Commonly known as: CRESTOR  What changed:   medication strength  how much to take  when to take this  Changed by: Rivera Duarte MD            CONTINUE taking these medications      aspirin 81 MG EC tablet     B COMPLEX 1 PO     clopidogrel 75 MG tablet  Commonly known as: PLAVIX  Take 1 tablet by mouth daily for 20 doses     cyclobenzaprine 10 MG tablet  Commonly known as: FLEXERIL     furosemide 20 MG tablet  Commonly known as: LASIX     losartan 50 MG tablet  Commonly known as: COZAAR     omeprazole 40 MG delayed release capsule  Commonly known as: PRILOSEC     vitamin D3 25 MCG (1000 UT) Tabs tablet  Commonly known as: CHOLECALCIFEROL                Medications marked \"taking\" at this time  Outpatient Medications Marked as Taking for the 9/16/22 encounter (Office Visit) with Rivera Duarte MD   Medication Sig Dispense Refill    rosuvastatin (CRESTOR) 20 MG tablet Take 1 tablet by mouth at bedtime      clopidogrel (PLAVIX) 75 MG (CVA) due to thrombosis of cerebral artery ([facility] [address]  Advised to use Plavix for 20 days along with aspirin  Will increase rosuvastatin to 20 mg at bedtime  Advised to follow with neurology    2. Former smoker  -     [place] AAA; Future  3. Health care maintenance  -     Hepatitis C Antibody; Future  4. Hospital discharge follow-up  -     [place] DISCHARGE MEDS RECONCILED W/ CURRENT OUTPATIENT MED LIST  5. Well-controlled hypertension: stable continue with losartan 40 mg daily  6. History of cerebral aneurysm repair s/p clipping in 2001      An electronic signature was used to authenticate this note.   --Almita Silva MD

## 2022-09-21 RX ORDER — CLOPIDOGREL BISULFATE 75 MG/1
TABLET ORAL
Qty: 21 TABLET | OUTPATIENT
Start: 2022-09-21

## 2022-09-29 ENCOUNTER — OFFICE VISIT (OUTPATIENT)
Dept: FAMILY MEDICINE CLINIC | Age: 72
End: 2022-09-29
Payer: MEDICARE

## 2022-09-29 VITALS
OXYGEN SATURATION: 99 % | BODY MASS INDEX: 24.65 KG/M2 | SYSTOLIC BLOOD PRESSURE: 120 MMHG | DIASTOLIC BLOOD PRESSURE: 70 MMHG | WEIGHT: 153.4 LBS | HEIGHT: 66 IN | HEART RATE: 70 BPM

## 2022-09-29 DIAGNOSIS — I10 ESSENTIAL HYPERTENSION: ICD-10-CM

## 2022-09-29 DIAGNOSIS — I63.411 CEREBRAL INFARCTION DUE TO EMBOLISM OF RIGHT MIDDLE CEREBRAL ARTERY (HCC): Primary | ICD-10-CM

## 2022-09-29 DIAGNOSIS — Z87.891 FORMER SMOKER: ICD-10-CM

## 2022-09-29 PROBLEM — F32.A ANXIETY AND DEPRESSION: Status: ACTIVE | Noted: 2018-02-15

## 2022-09-29 PROBLEM — M54.50 LOW BACK PAIN: Status: ACTIVE | Noted: 2018-03-01

## 2022-09-29 PROBLEM — F41.9 ANXIETY AND DEPRESSION: Status: ACTIVE | Noted: 2018-02-15

## 2022-09-29 PROBLEM — M25.552 PAIN OF LEFT HIP JOINT: Status: ACTIVE | Noted: 2018-02-26

## 2022-09-29 PROBLEM — N52.9 IMPOTENCE: Status: ACTIVE | Noted: 2019-04-12

## 2022-09-29 PROBLEM — M54.16 LUMBAR RADICULOPATHY: Status: ACTIVE | Noted: 2020-08-10

## 2022-09-29 PROBLEM — M51.36 DEGENERATIVE DISC DISEASE, LUMBAR: Status: ACTIVE | Noted: 2020-07-09

## 2022-09-29 PROBLEM — R30.0 DYSURIA: Status: ACTIVE | Noted: 2019-03-01

## 2022-09-29 PROBLEM — N40.1 BENIGN PROSTATIC HYPERPLASIA WITH URINARY OBSTRUCTION: Status: ACTIVE | Noted: 2019-03-01

## 2022-09-29 PROBLEM — N28.1 RENAL CYST: Status: ACTIVE | Noted: 2020-11-24

## 2022-09-29 PROBLEM — N13.8 BENIGN PROSTATIC HYPERPLASIA WITH URINARY OBSTRUCTION: Status: ACTIVE | Noted: 2019-03-01

## 2022-09-29 PROBLEM — G57.00 PIRIFORMIS SYNDROME: Status: ACTIVE | Noted: 2020-09-23

## 2022-09-29 PROBLEM — M48.062 LUMBAR STENOSIS WITH NEUROGENIC CLAUDICATION: Status: ACTIVE | Noted: 2022-08-03

## 2022-09-29 PROBLEM — M25.551 PAIN OF RIGHT HIP JOINT: Status: ACTIVE | Noted: 2018-02-26

## 2022-09-29 PROBLEM — N40.0 ENLARGED PROSTATE: Status: ACTIVE | Noted: 2019-05-15

## 2022-09-29 PROBLEM — E55.9 VITAMIN D DEFICIENCY: Status: ACTIVE | Noted: 2020-11-24

## 2022-09-29 PROBLEM — M53.3 PAIN OF LEFT SACROILIAC JOINT: Status: ACTIVE | Noted: 2022-08-17

## 2022-09-29 PROBLEM — K21.9 GERD (GASTROESOPHAGEAL REFLUX DISEASE): Status: ACTIVE | Noted: 2018-02-15

## 2022-09-29 PROCEDURE — 1123F ACP DISCUSS/DSCN MKR DOCD: CPT | Performed by: STUDENT IN AN ORGANIZED HEALTH CARE EDUCATION/TRAINING PROGRAM

## 2022-09-29 PROCEDURE — 99203 OFFICE O/P NEW LOW 30 MIN: CPT | Performed by: STUDENT IN AN ORGANIZED HEALTH CARE EDUCATION/TRAINING PROGRAM

## 2022-09-29 PROCEDURE — 99202 OFFICE O/P NEW SF 15 MIN: CPT

## 2022-09-29 RX ORDER — ACETAMINOPHEN 500 MG
500 TABLET ORAL EVERY 6 HOURS PRN
COMMUNITY

## 2022-09-29 RX ORDER — PANTOPRAZOLE SODIUM 40 MG/1
TABLET, DELAYED RELEASE ORAL
COMMUNITY
Start: 2022-09-23

## 2022-09-29 RX ORDER — MELOXICAM 15 MG/1
15 TABLET ORAL DAILY
COMMUNITY

## 2022-09-29 ASSESSMENT — PATIENT HEALTH QUESTIONNAIRE - PHQ9
1. LITTLE INTEREST OR PLEASURE IN DOING THINGS: 0
SUM OF ALL RESPONSES TO PHQ QUESTIONS 1-9: 1
SUM OF ALL RESPONSES TO PHQ9 QUESTIONS 1 & 2: 1
2. FEELING DOWN, DEPRESSED OR HOPELESS: 1
SUM OF ALL RESPONSES TO PHQ QUESTIONS 1-9: 1

## 2022-09-29 NOTE — PROGRESS NOTES
FREDD[facility] [address]  [address] [address]  Dept: [phone]  Dept Fax: [phone]  Loc: [phone]      Mila Henriquez is a 74 y.o. male who presents today for:  Chief Complaint   Patient presents with    Establish Care     No concerns         Goals    None         HPI:     HPI  Patient is a 42-year-old male who presents to the office today to establish care. Patient recently discharged from the hospital on 9/2/2022 for CVA. Had a history of right MCA aneurysm with clipping done in 2001, presented to the ER for left-sided numbness tingling and weakness and received tPA. CTA of the head and neck showed a right vertebral artery occlusion and patient was admitted to [facility]'s neuro ICU for post tPA management. Throughout his hospital stay he had significant improvement in his symptoms and by the time of discharge he was mostly back to his baseline. Patient has post hospital follow-up and transition of care completed by internal medicine in [place] on 9/16/2022. Medications were reviewed at that time and patient was instructed to use Plavix as well as aspirin until seen by neurology. Crestor was increased at that time. Has follow-up with neurology scheduled for later in October. Overall patient states he has been doing well. Today patient is here to establish care as [place] is too far for him to travel to doctors visits. Would qualify for AAA screening at this time.     Past Medical History:   Diagnosis Date    Chronic back pain       Past Surgical History:   Procedure Laterality Date    APPENDECTOMY      BRAIN ANEURYSM SURGERY      UNKNOWN BRAIN ANEURYSM CLIP FROM 20+ YEARS AGO, NO INFORMATION, NO MRI - EL 9/1/22    CHOLECYSTECTOMY      HERNIA REPAIR      ROTATOR CUFF REPAIR      TONSILLECTOMY       Family History   Problem Relation Age of Onset    Gall Bladder Disease Mother     Lung Cancer Father Cancer Sister     Kidney Disease Sister      Social History     Tobacco Use    Smoking status: Former     Packs/day: 0.50     Years: 20.00     Pack years: 10.00     Types: Cigarettes     Quit date:      Years since quittin.7    Smokeless tobacco: Never   Substance Use Topics    Alcohol use: Not Currently      Current Outpatient Medications   Medication Sig Dispense Refill    pantoprazole (PROTONIX) 40 MG tablet TAKE 1 TABLET BY MOUTH EVERY DAY      acetaminophen (TYLENOL) 500 MG tablet Take 500 mg by mouth every 6 hours as needed for Pain Take by mouth every 6 hours as needed      meloxicam (MOBIC) 15 MG tablet Take 15 mg by mouth daily      Multiple Vitamins-Iron (MULTI-VITAMIN/IRON PO) Take by mouth      rosuvastatin (CRESTOR) 20 MG tablet Take 1 tablet by mouth at bedtime      clopidogrel (PLAVIX) 75 MG tablet Take 1 tablet by mouth daily for 20 doses 20 tablet 0    losartan (COZAAR) 50 MG tablet Take 50 mg by mouth daily      vitamin D3 (CHOLECALCIFEROL) 25 MCG (1000 UT) TABS tablet Take 1,000 Units by mouth daily      B Complex Vitamins (B COMPLEX 1 PO) Take 1 tablet by mouth daily      cyclobenzaprine (FLEXERIL) 10 MG tablet Take 10 mg by mouth daily as needed for Muscle spasms      furosemide (LASIX) 20 MG tablet Take 20 mg by mouth every other day       No current facility-administered medications for this visit.      No Known Allergies    Health Maintenance   Topic Date Due    Hepatitis C screen  Never done    AAA screen  Never done    Annual Wellness Visit (AWV)  Never done    COVID-19 Vaccine (3 - Booster for Jordin series) 2022    Flu vaccine (1) 2023 (Originally 2022)    DTaP/Tdap/Td vaccine (1 - Tdap) 2023 (Originally 1967)    Shingles vaccine (1 of 2) 2023 (Originally 1998)    A1C test (Diabetic or Prediabetic)  2023    Lipids  2023    Depression Monitoring  2023    Colorectal Cancer Screen  2029    Pneumococcal 65+ years Vaccine Completed    Hepatitis A vaccine  Aged Out    Hepatitis B vaccine  Aged Out    Hib vaccine  Aged Out    Meningococcal (ACWY) vaccine  Aged Out       Subjective:      Review of Systems   Constitutional:  Negative for chills, fatigue and fever. HENT:  Negative for ear pain, postnasal drip and trouble swallowing. Eyes:  Negative for pain and visual disturbance. Respiratory:  Negative for cough and shortness of breath. Cardiovascular:  Negative for chest pain and palpitations. Gastrointestinal:  Negative for abdominal pain, blood in stool, constipation, diarrhea, nausea and vomiting. Genitourinary:  Negative for dysuria and urgency. Skin:  Negative for rash and wound. Neurological:  Negative for dizziness and headaches. Psychiatric/Behavioral:  Negative for dysphoric mood. The patient is not nervous/anxious. Objective:     Vitals:    09/29/22 1423   BP: 120/70   Pulse: 70   SpO2: 99%   Weight: 153 lb 6.4 oz (69.6 kg)   Height: 5' 6\" (1.676 m)       Body mass index is 24.76 kg/m². Wt Readings from Last 3 Encounters:   09/29/22 153 lb 6.4 oz (69.6 kg)   09/16/22 151 lb (68.5 kg)   08/31/22 147 lb 14.4 oz (67.1 kg)     BP Readings from Last 3 Encounters:   09/29/22 120/70   09/16/22 131/69   09/02/22 (!) 175/47       Physical Exam  Vitals and nursing note reviewed. Constitutional:       General: He is not in acute distress. Appearance: He is well-developed. He is not diaphoretic. HENT:      Head: Normocephalic and atraumatic. Right Ear: External ear normal.      Left Ear: External ear normal.      Nose: Nose normal.   Eyes:      General: No scleral icterus. Right eye: No discharge. Left eye: No discharge. Conjunctiva/sclera: Conjunctivae normal.   Cardiovascular:      Rate and Rhythm: Normal rate and regular rhythm. Heart sounds: Normal heart sounds. No murmur heard.   Pulmonary:      Effort: Pulmonary effort is normal.      Breath sounds: Normal breath sounds. Musculoskeletal:      Cervical back: Normal range of motion. Skin:     General: Skin is warm and dry. Findings: No erythema or rash. Neurological:      Mental Status: He is alert and oriented to person, place, and time. Cranial Nerves: No cranial nerve deficit. Psychiatric:         Behavior: Behavior normal.         Thought Content: Thought content normal.         Judgment: Judgment normal.       Lab Results   Component Value Date    WBC 11.2 09/02/2022    HGB 13.1 09/02/2022    HCT 40.5 (L) 09/02/2022     09/02/2022    CHOL 87 08/31/2022    TRIG 64 08/31/2022    HDL 36 (L) 08/31/2022    AST 28 02/09/2022     09/02/2022    K 4.1 09/02/2022     (H) 09/02/2022    CREATININE 0.72 09/02/2022    BUN 13 09/02/2022    CO2 23 09/02/2022    TSH 1.76 02/09/2022    PSA 3.75 02/09/2022    INR 1.1 08/31/2022    LABA1C 5.7 08/31/2022    LABGLOM >60 09/02/2022    CALCIUM 8.4 (L) 09/02/2022    VITD25 23.0 (L) 08/06/2020       Imaging Results:    Echo Complete    Result Date: 9/2/2022  Transthoracic Echocardiography Report (TTE)  Patient Name Bita Gramajo Date of Study               09/02/2022   Date of      1950  Gender                      Male  Birth   Age          74 year(s)  Race                           Room Number  117         Height:                     72 inch, 182.88 cm   Corporate ID [id number]    Weight:                     156 pounds, 70.8 kg  #   Patient Acct [de-identified]   BSA:          1.92 m^2      BMI:     21.16 kg/m^2  #   MR #         [id number]     Barrett Alvarado   Accession #  [id number]  Interpreting Physician      [address] [place] [address]   Fellow                   Referring Nurse                           Practitioner   Interpreting             Referring Physician         Sophia Villalta MD  Fellow  Type of Study   TTE procedure:2D Echocardiogram, M-Mode, Doppler, Color Doppler, Bubble  Study.   Procedure Date Date: 09/02/2022 Start: 11:19 AM Study Location: [facility] Indications:CVA. History / Tech. Comments: CVA Patient Status: Inpatient Height: 72 inches Weight: 156 pounds BSA: 1.92 m^2 BMI: 21.16 kg/m^2 CONCLUSIONS Summary Left ventricle is normal in size with systolic function is visually low normal, LVEF 50%. Abnormal septal wall motion. Calculated ejection fraction by bi-plane Norwood's method is 55%. Left atrium is normal in size. No evidence of atrial septal shunting via color doppler or agitated saline injection. Mitral valve structure is normal. Mild mitral regurgitation. Tricuspid valve structure is normal. Mild tricuspid regurgitation. Estimated right ventricular systolic pressure is 27 mmHg. IVC diameter is normal with poor inspiratory collapse. Signature ----------------------------------------------------------------------------  Electronically signed by Barrett Olvera(Sonographer) on 09/02/2022 12:55  PM ---------------------------------------------------------------------------- ----------------------------------------------------------------------------  Electronically signed by Jeff Dewey(Interpreting physician) on 09/02/2022  01:14 PM ---------------------------------------------------------------------------- FINDINGS Left Atrium Left atrium is normal in size. No evidence of atrial septal shunting via color doppler or agitated saline injection. Left Ventricle Left ventricle is normal in size with systolic function is visually low normal, LVEF 50%. Abnormal septal wall motion. Calculated ejection fraction by bi-plane Norwood's method is 55%. Right Atrium Right atrium is normal in size. Right Ventricle Normal right ventricular size and function. TAPSE measures 2.2 cm Mitral Valve Mitral valve structure is normal. Mild mitral regurgitation. Aortic Valve Aortic valve is trileaflet and opens normally. No aortic insufficiency. Tricuspid Valve Tricuspid valve structure is normal. Mild tricuspid regurgitation.  Estimated right ventricular systolic pressure is 27 mmHg. Pulmonic Valve The pulmonic valve is normal in structure. Pericardial Effusion No significant pericardial effusion is seen. Miscellaneous Normal aortic root dimension. E/E' average = 10. IVC diameter is normal with poor inspiratory collapse. Bubble study was negative.  M-mode / 2D Measurements & Calculations:   LVIDd:5 cm(3.7 - 5.6 cm)         Diastolic UUGCGM:26.8 ml  HULZQ:1.64 cm(2.2 - 4.0 cm)      Systolic OFVVTK:55.8 ml  PEYO:8.1 cm(0.6 - 1.1 cm)        LA Dimension: 3.2 cm(1.9 - 4.0 cm)  LVPWd:0.9 cm(0.6 - 1.1 cm)       LA volume/Index: 39.63 ml /21m^2  Fractional Shortenin.6 %     LVOT:2.1 cm  Calculated LVEF (%): 55.62 %     RVDd:2.9 cm   Mitral:                                 Aortic   Valve Area (P1/2-Time): 3.61 cm^2       Peak Velocity: 1.13 m/s  Peak E-Wave: 0.67 m/s                   Mean Velocity: 0.72 m/s  Peak A-Wave: 0.90 m/s                   Peak Gradient: 5.11 mmHg  E/A Ratio: 0.75                         Mean Gradient: 2 mmHg  Peak Gradient: 1.81 mmHg  Mean Gradient: 1 mmHg  Deceleration Time: 237 msec             Area (continuity): 2.61 cm^2  P1/2t: 61 msec                          AV VTI: 24.7 cm   Area (continuity): 2.39 cm^2  Mean Velocity: 0.49 m/s   Tricuspid:                              Pulmonic:   Estimated RVSP: 27 mmHg                 Peak Velocity: 1.07 m/s  Peak TR Velocity: 2.07 m/s              Peak Gradient: 4.58 mmHg  Peak TR Gradient: 17.1396 mmHg  Estimated RA Pressure: 10 mmHg                                           Estimated PASP: 27.14 mmHg  Diastology / Tissue Doppler Septal Wall E' velocity:0.06 m/s Septal Wall E/E':11 Lateral Wall E' velocity:0.08 m/s Lateral Wall E/E':8    CT head without contrast    Result Date: 2022  EXAMINATION: CT OF THE HEAD WITHOUT CONTRAST  2022 4:50 pm TECHNIQUE: CT of the head was performed without the administration of intravenous contrast. Automated exposure control, iterative reconstruction, and/or weight based adjustment of the mA/kV was utilized to reduce the radiation dose to as low as reasonably achievable. COMPARISON: CT brain/CT angiogram brain 08/31/2022 HISTORY: ORDERING SYSTEM PROVIDED HISTORY: 24 hours post thrombolytic agent TECHNOLOGIST PROVIDED HISTORY: Obtain 24 hours after administration of thrombolytic agent 24 hours post thrombolytic agent Reason for Exam: 24 hours post thrombolytic agent FINDINGS: BRAIN/VENTRICLES: An age-indeterminate but suspected remote lacunar infarct within the right thalamus is noted. There is no acute infarct or acute intracranial hemorrhage. There is no mass effect or midline shift. The sequelae of right temporal craniotomy and right MCA aneurysm clipping is noted. There is encephalomalacia within the right temporal lobe, unchanged. There is no ventriculomegaly or abnormal extra-axial fluid collection present. Hypoattenuation is present within the periventricular and deep white matter of both hemispheres. ORBITS: Limited evaluation of the orbits is unremarkable. SINUSES: The paranasal sinuses and mastoid air cells are clear. SOFT TISSUES/SKULL:  The sequelae of right temporal craniotomy is noted. No lytic or blastic osseous lesions are identified. 1. Age-indeterminate but suspected remote lacunar infarct within the right thalamus. 2. No acute intracranial hemorrhage identified. 3. Sequelae of right MCA aneurysm clipping. CT HEAD WO CONTRAST    Result Date: 8/31/2022  EXAMINATION: CT OF THE HEAD WITHOUT CONTRAST  8/31/2022 4:19 pm TECHNIQUE: CT of the head was performed without the administration of intravenous contrast. Automated exposure control, iterative reconstruction, and/or weight based adjustment of the mA/kV was utilized to reduce the radiation dose to as low as reasonably achievable. COMPARISON: None.  HISTORY: ORDERING SYSTEM PROVIDED HISTORY: left sided weakness and numbness TECHNOLOGIST PROVIDED HISTORY: left sided weakness and numbness Decision Support Exception - unselect if not a suspected or confirmed emergency medical condition->Emergency Medical Condition (MA) Reason for Exam: left sided weakness and numbness FINDINGS: The patient is status post a right-sided craniotomy with aneurysm clipping of the right MCA. There is encephalomalacia of the adjacent frontotemporal lobe. Mild chronic white matter microvascular ischemic changes are noted. There is an age-indeterminate lacunar infarct within the right thalamus. No mass, shift, or bleed is identified. Age-indeterminate lacunar infarct within the right thalamus. Findings were discussed with Jose Campbell MD at 4:41 pm on 8/31/2022. XR CHEST PORTABLE    Result Date: 9/1/2022  EXAMINATION: ONE XRAY VIEW OF THE CHEST 9/1/2022 12:31 am COMPARISON: None. HISTORY: ORDERING SYSTEM PROVIDED HISTORY: leukocytosis TECHNOLOGIST PROVIDED HISTORY: leukocytosis Reason for Exam: upr,hx stroke FINDINGS: Lungs are clear. Cardiomediastinal silhouette is within normal limits. No pleural effusion. No pneumothorax. Bony structures are unremarkable. No acute findings. CTA HEAD NECK W CONTRAST    Addendum Date: 8/31/2022    ADDENDUM: Findings were discussed with Jose Campbell MD at 5:15 pm on 8/31/2022. Result Date: 8/31/2022  EXAMINATION: CTA OF THE HEAD AND NECK WITH CONTRAST 8/31/2022 4:28 pm: TECHNIQUE: CTA of the head and neck was performed with the administration of intravenous contrast. Multiplanar reformatted images are provided for review. MIP images are provided for review. Stenosis of the internal carotid arteries measured using NASCET criteria. Automated exposure control, iterative reconstruction, and/or weight based adjustment of the mA/kV was utilized to reduce the radiation dose to as low as reasonably achievable. COMPARISON: None.  HISTORY: ORDERING SYSTEM PROVIDED HISTORY: left sided weakness and numbness TECHNOLOGIST PROVIDED HISTORY: left sided weakness and numbness Decision Support Exception - unselect if not a suspected or confirmed emergency medical condition->Emergency Medical Condition (MA) Reason for Exam: left sided weakness and numbness FINDINGS: CTA NECK: AORTIC ARCH/ARCH VESSELS: No dissection or arterial injury. No significant stenosis of the brachiocephalic or subclavian arteries. CAROTID ARTERIES: No dissection, arterial injury, or hemodynamically significant stenosis by NASCET criteria. VERTEBRAL ARTERIES: The left vertebral artery is dominant and is patent. There appears to be a critical stenosis at the origin of the right vertebral artery with attenuated flow and eventual occlusion at the level of the C3 vertebral body. There is reconstitution of flow beginning at the V3/V4 junction, likely filling in retrograde fashion. SOFT TISSUES: The lung apices are clear. No cervical or superior mediastinal lymphadenopathy. The larynx and pharynx are unremarkable. No acute abnormality of the salivary and thyroid glands. BONES: No acute osseous abnormality. CTA HEAD: ANTERIOR CIRCULATION: No significant stenosis of the intracranial internal carotid, anterior cerebral, or middle cerebral arteries. Status post right MCA aneurysm clipping, without evidence of aneurysmal remnant. POSTERIOR CIRCULATION: No significant stenosis of the vertebral, basilar, or posterior cerebral arteries. No aneurysm. OTHER: No dural venous sinus thrombosis on this non-dedicated study. BRAIN: See separately dictated noncontrast head CT report. Occluded right vertebral artery with reconstitution of flow distally; age-indeterminate. Status post right MCA aneurysm clipping, without evidence of aneurysmal remnant. RECOMMENDATIONS: Unavailable         Assessment/Plan:     Tyrell was seen today for establish care.     Diagnoses and all orders for this visit:    Cerebral infarction due to embolism of right middle cerebral artery [facility])    Former smoker  -     [place] AAA; Future    Essential hypertension    CVA-symptoms significantly improved recommend follow-up with neurology at [facility] on 10/27/2022. Continue Plavix 75 mg once daily until seen by neurology. Essential hypertension-blood pressure well controlled in the office today continue Lasix, Cozaar, Crestor. Former smoker due for ultrasound screening for AAA. Test ordered at this time. Return to the office in 3 months     No follow-ups on file. Medications Prescribed:  No orders of the defined types were placed in this encounter. Future Appointments   Date Time Provider Sabino Topete   10/17/2022  9:00 AM [facility] VASCULAR ULTRASOUND RM 1 [facility] [place]   10/27/2022  3:30 PM Pinky Pal DO Neuro [id number]   12/29/2022  1:40 PM Everardo Boyce DO [facility]       Patient given educational materials - see patient instructions. Discussed use, benefit, and sideeffects of prescribed medications. All patient questions answered. Pt voiced understanding. Reviewed health maintenance. Instructed to continue current medications, diet and exercise. Patient agreed with treatment plan. Follow up as directed.      Electronically signed by Karlos Naranjo DO on 10/3/2022 at 12:33 PM

## 2022-10-03 ASSESSMENT — ENCOUNTER SYMPTOMS
VOMITING: 0
ABDOMINAL PAIN: 0
DIARRHEA: 0
BLOOD IN STOOL: 0
NAUSEA: 0
CONSTIPATION: 0
SHORTNESS OF BREATH: 0
TROUBLE SWALLOWING: 0
EYE PAIN: 0
COUGH: 0

## 2022-10-17 ENCOUNTER — HOSPITAL ENCOUNTER (OUTPATIENT)
Dept: INTERVENTIONAL RADIOLOGY/VASCULAR | Age: 72
Discharge: HOME | End: 2022-10-19
Payer: MEDICARE

## 2022-10-17 DIAGNOSIS — Z87.891 FORMER SMOKER: ICD-10-CM

## 2022-10-17 PROCEDURE — 76706 US ABDL AORTA SCREEN AAA: CPT

## 2022-11-14 ENCOUNTER — TELEPHONE (OUTPATIENT)
Dept: FAMILY MEDICINE CLINIC | Age: 72
End: 2022-11-14

## 2022-11-14 NOTE — TELEPHONE ENCOUNTER
Spoke with pt and he stated he hasn't had lab work done since last year. He was asking to check his cholesterol. BMP, CBC was completed on 09/02/22. He asked to have these orders faxed to [facility] in [place], [place]. Per Dr Jignesh Caballero, no need for lab orders at this time.  Pr verbalized understanding

## 2023-01-24 ENCOUNTER — OFFICE VISIT (OUTPATIENT)
Dept: FAMILY MEDICINE CLINIC | Age: 73
End: 2023-01-24
Payer: MEDICARE

## 2023-01-24 VITALS
HEART RATE: 61 BPM | SYSTOLIC BLOOD PRESSURE: 138 MMHG | WEIGHT: 163 LBS | OXYGEN SATURATION: 99 % | BODY MASS INDEX: 26.2 KG/M2 | DIASTOLIC BLOOD PRESSURE: 70 MMHG | HEIGHT: 66 IN | RESPIRATION RATE: 16 BRPM

## 2023-01-24 DIAGNOSIS — N13.8 BENIGN PROSTATIC HYPERPLASIA WITH URINARY OBSTRUCTION: ICD-10-CM

## 2023-01-24 DIAGNOSIS — G89.29 CHRONIC BILATERAL LOW BACK PAIN, UNSPECIFIED WHETHER SCIATICA PRESENT: ICD-10-CM

## 2023-01-24 DIAGNOSIS — M54.50 CHRONIC BILATERAL LOW BACK PAIN, UNSPECIFIED WHETHER SCIATICA PRESENT: ICD-10-CM

## 2023-01-24 DIAGNOSIS — I10 ESSENTIAL HYPERTENSION: Primary | ICD-10-CM

## 2023-01-24 DIAGNOSIS — N40.1 BENIGN PROSTATIC HYPERPLASIA WITH URINARY OBSTRUCTION: ICD-10-CM

## 2023-01-24 DIAGNOSIS — I63.411 CEREBRAL INFARCTION DUE TO EMBOLISM OF RIGHT MIDDLE CEREBRAL ARTERY (HCC): ICD-10-CM

## 2023-01-24 PROCEDURE — 1123F ACP DISCUSS/DSCN MKR DOCD: CPT | Performed by: STUDENT IN AN ORGANIZED HEALTH CARE EDUCATION/TRAINING PROGRAM

## 2023-01-24 PROCEDURE — 3074F SYST BP LT 130 MM HG: CPT | Performed by: STUDENT IN AN ORGANIZED HEALTH CARE EDUCATION/TRAINING PROGRAM

## 2023-01-24 PROCEDURE — 99213 OFFICE O/P EST LOW 20 MIN: CPT

## 2023-01-24 PROCEDURE — 3078F DIAST BP <80 MM HG: CPT | Performed by: STUDENT IN AN ORGANIZED HEALTH CARE EDUCATION/TRAINING PROGRAM

## 2023-01-24 PROCEDURE — 99214 OFFICE O/P EST MOD 30 MIN: CPT | Performed by: STUDENT IN AN ORGANIZED HEALTH CARE EDUCATION/TRAINING PROGRAM

## 2023-01-24 RX ORDER — MELOXICAM 15 MG/1
15 TABLET ORAL DAILY
Qty: 30 TABLET | Refills: 1 | Status: SHIPPED | OUTPATIENT
Start: 2023-01-24

## 2023-01-24 RX ORDER — ASPIRIN 81 MG/1
81 TABLET ORAL DAILY
COMMUNITY
Start: 2022-12-21 | End: 2023-12-21

## 2023-01-24 ASSESSMENT — PATIENT HEALTH QUESTIONNAIRE - PHQ9
SUM OF ALL RESPONSES TO PHQ9 QUESTIONS 1 & 2: 0
SUM OF ALL RESPONSES TO PHQ QUESTIONS 1-9: 0
1. LITTLE INTEREST OR PLEASURE IN DOING THINGS: 0
SUM OF ALL RESPONSES TO PHQ QUESTIONS 1-9: 4
5. POOR APPETITE OR OVEREATING: 0
6. FEELING BAD ABOUT YOURSELF - OR THAT YOU ARE A FAILURE OR HAVE LET YOURSELF OR YOUR FAMILY DOWN: 0
SUM OF ALL RESPONSES TO PHQ QUESTIONS 1-9: 0
7. TROUBLE CONCENTRATING ON THINGS, SUCH AS READING THE NEWSPAPER OR WATCHING TELEVISION: 1
SUM OF ALL RESPONSES TO PHQ QUESTIONS 1-9: 0
3. TROUBLE FALLING OR STAYING ASLEEP: 0
SUM OF ALL RESPONSES TO PHQ QUESTIONS 1-9: 4
SUM OF ALL RESPONSES TO PHQ QUESTIONS 1-9: 4
10. IF YOU CHECKED OFF ANY PROBLEMS, HOW DIFFICULT HAVE THESE PROBLEMS MADE IT FOR YOU TO DO YOUR WORK, TAKE CARE OF THINGS AT HOME, OR GET ALONG WITH OTHER PEOPLE: 0
SUM OF ALL RESPONSES TO PHQ QUESTIONS 1-9: 4
4. FEELING TIRED OR HAVING LITTLE ENERGY: 2
2. FEELING DOWN, DEPRESSED OR HOPELESS: 0
2. FEELING DOWN, DEPRESSED OR HOPELESS: 1
SUM OF ALL RESPONSES TO PHQ QUESTIONS 1-9: 0
9. THOUGHTS THAT YOU WOULD BE BETTER OFF DEAD, OR OF HURTING YOURSELF: 0
8. MOVING OR SPEAKING SO SLOWLY THAT OTHER PEOPLE COULD HAVE NOTICED. OR THE OPPOSITE, BEING SO FIGETY OR RESTLESS THAT YOU HAVE BEEN MOVING AROUND A LOT MORE THAN USUAL: 0

## 2023-01-24 NOTE — PROGRESS NOTES
FREDDIE Perez [address]  [address] [address]  Dept: [phone]  Dept Fax: [phone]  Loc: [phone]      Mila Henriquez is a 74 y.o. male who presents today for:  Chief Complaint   Patient presents with    Hypertension     Follow up        Goals    None         HPI:     HPI  Patient is a 58-year-old male who presents to the office for follow-up for chronic conditions. Here for hypertension follow-up. Has been taking Lasix, Cozaar for essential hypertension with significant benefit. Blood pressure in the office today is 138/70. Denies any other issues with his blood pressure at this time. Does have a history of CVA for which she continues to take baby aspirin and Crestor daily. Previously was on Plavix however has stopped this medication. Denies any residual symptoms of his CVA at this time. Chronic bilateral low back pain taking Mobic for this issue. This seems to be helping his symptoms. History of BPH with some urinary obstruction. Last PSA screening was completed in February 2022 was noted to be 3.75. States that he has a lot of issues with urination. Will frequently get up in the middle the night to urinate. Feels like he has a decreased flow and has difficulties with starting urination.     Past Medical History:   Diagnosis Date    Chronic back pain       Past Surgical History:   Procedure Laterality Date    APPENDECTOMY      BRAIN ANEURYSM SURGERY      UNKNOWN BRAIN ANEURYSM CLIP FROM 20+ YEARS AGO, NO INFORMATION, NO MRI - EL 9/1/22    CHOLECYSTECTOMY      HERNIA REPAIR      ROTATOR CUFF REPAIR      TONSILLECTOMY       Family History   Problem Relation Age of Onset    Gall Bladder Disease Mother     Lung Cancer Father     Cancer Sister     Kidney Disease Sister      Social History     Tobacco Use    Smoking status: Former     Packs/day: 0.50     Years: 20.00     Pack years: 10.00     Types: Cigarettes     Quit date:      Years since quittin.1    Smokeless tobacco: Never   Substance Use Topics    Alcohol use: Not Currently      Current Outpatient Medications   Medication Sig Dispense Refill    aspirin 81 MG EC tablet Take 81 mg by mouth daily      meloxicam (MOBIC) 15 MG tablet Take 1 tablet by mouth daily 30 tablet 1    pantoprazole (PROTONIX) 40 MG tablet TAKE 1 TABLET BY MOUTH EVERY DAY      Multiple Vitamins-Iron (MULTI-VITAMIN/IRON PO) Take by mouth      rosuvastatin (CRESTOR) 20 MG tablet Take 1 tablet by mouth at bedtime      losartan (COZAAR) 50 MG tablet Take 50 mg by mouth daily      vitamin D3 (CHOLECALCIFEROL) 25 MCG (1000 UT) TABS tablet Take 1,000 Units by mouth daily      B Complex Vitamins (B COMPLEX 1 PO) Take 1 tablet by mouth daily      cyclobenzaprine (FLEXERIL) 10 MG tablet Take 10 mg by mouth daily as needed for Muscle spasms      furosemide (LASIX) 20 MG tablet Take 20 mg by mouth every other day       No current facility-administered medications for this visit. No Known Allergies    Health Maintenance   Topic Date Due    Hepatitis C screen  Never done    COVID-19 Vaccine (3 - Booster for Jordin series) 2022    DTaP/Tdap/Td vaccine (1 - Tdap) 2023 (Originally 1969)    Shingles vaccine (1 of 2) 2023 (Originally 1998)    A1C test (Diabetic or Prediabetic)  2023    Lipids  2023    Depression Monitoring  2024    Annual Wellness Visit (AWV)  2024    Colorectal Cancer Screen  2029    Flu vaccine  Completed    Pneumococcal 65+ years Vaccine  Completed    AAA screen  Completed    Hepatitis A vaccine  Aged Out    Hib vaccine  Aged Out    Meningococcal (ACWY) vaccine  Aged Out       Subjective:      Review of Systems   Constitutional:  Negative for chills, fatigue and fever. HENT:  Negative for ear pain, postnasal drip and trouble swallowing. Eyes:  Negative for pain and visual disturbance.    Respiratory:  Negative for cough and shortness of breath. Cardiovascular:  Negative for chest pain and palpitations. Gastrointestinal:  Negative for abdominal pain, blood in stool, constipation, diarrhea, nausea and vomiting. Genitourinary:  Positive for decreased urine volume and difficulty urinating. Negative for dysuria and urgency. Skin:  Negative for rash and wound. Neurological:  Negative for dizziness and headaches. Psychiatric/Behavioral:  Negative for dysphoric mood. The patient is not nervous/anxious. Objective:     Vitals:    01/24/23 1452 01/24/23 1514   BP: (!) 154/60 138/70   Pulse: 61    Resp: 16    SpO2: 99%    Weight: 163 lb (73.9 kg)    Height: 5' 6\" (1.676 m)        Body mass index is 26.31 kg/m². Wt Readings from Last 3 Encounters:   01/24/23 163 lb (73.9 kg)   09/29/22 153 lb 6.4 oz (69.6 kg)   09/16/22 151 lb (68.5 kg)     BP Readings from Last 3 Encounters:   01/24/23 138/70   09/29/22 120/70   09/16/22 131/69       Physical Exam  Vitals and nursing note reviewed. Constitutional:       General: He is not in acute distress. Appearance: He is well-developed. He is not diaphoretic. HENT:      Head: Normocephalic and atraumatic. Right Ear: External ear normal.      Left Ear: External ear normal.      Nose: Nose normal.   Eyes:      General: No scleral icterus. Right eye: No discharge. Left eye: No discharge. Conjunctiva/sclera: Conjunctivae normal.   Cardiovascular:      Rate and Rhythm: Normal rate and regular rhythm. Heart sounds: Normal heart sounds. No murmur heard. Pulmonary:      Effort: Pulmonary effort is normal.      Breath sounds: Normal breath sounds. Musculoskeletal:      Cervical back: Normal range of motion. Skin:     General: Skin is warm and dry. Findings: No erythema or rash. Neurological:      Mental Status: He is alert and oriented to person, place, and time. Cranial Nerves: No cranial nerve deficit.    Psychiatric: Behavior: Behavior normal.         Thought Content: Thought content normal.         Judgment: Judgment normal.       Lab Results   Component Value Date    WBC 11.2 09/02/2022    HGB 13.1 09/02/2022    HCT 40.5 (L) 09/02/2022     09/02/2022    CHOL 87 08/31/2022    TRIG 64 08/31/2022    HDL 36 (L) 08/31/2022    AST 28 02/09/2022     09/02/2022    K 4.1 09/02/2022     (H) 09/02/2022    CREATININE 0.72 09/02/2022    BUN 13 09/02/2022    CO2 23 09/02/2022    TSH 1.76 02/09/2022    PSA 3.75 02/09/2022    INR 1.1 08/31/2022    LABA1C 5.7 08/31/2022    LABGLOM >60 09/02/2022    CALCIUM 8.4 (L) 09/02/2022    VITD25 23.0 (L) 08/06/2020       Imaging Results:    No results found.      Assessment/Plan:     Tyrell was seen today for hypertension.    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    Likely benign prostatic hyperplasia with urinary obstruction and difficulty urinating PSA due for recheck in 1 month.  Will refer to urology at this time for further discussion of his symptoms.  Recommend return to the office if symptoms worsen or fail to improve.    Chronic bilateral low back pain continue Mobic as this provides significant relief.       Return in about 6 months (around 7/24/2023).      Medications Prescribed:  Orders Placed This Encounter   Medications    meloxicam (MOBIC) 15 MG tablet     Sig: Take 1 tablet by mouth daily     Dispense:  30 tablet     Refill:  1         Future Appointments   Date Time Provider Department Center   2/27/2023 11:40 AM MD BERTHA Lal  [facility]   7/24/2023  2:20 PM Everardo Garvey DO [facility]       Patient given educational materials - see patient instructions. Discussed use, benefit, and side effects of prescribed medications. All patient questions answered. Pt voiced understanding. Reviewed health maintenance. Instructed to continue current medications, diet and exercise. Patient agreed with treatment plan. Follow up as directed.      Electronically signed by Magan Betancourt DO on 2/1/2023 at 9:29 AM

## 2023-01-30 ENCOUNTER — TELEMEDICINE (OUTPATIENT)
Dept: FAMILY MEDICINE CLINIC | Age: 73
End: 2023-01-30
Payer: MEDICARE

## 2023-01-30 DIAGNOSIS — Z00.00 INITIAL MEDICARE ANNUAL WELLNESS VISIT: Primary | ICD-10-CM

## 2023-01-30 PROCEDURE — 1123F ACP DISCUSS/DSCN MKR DOCD: CPT | Performed by: STUDENT IN AN ORGANIZED HEALTH CARE EDUCATION/TRAINING PROGRAM

## 2023-01-30 PROCEDURE — G0438 PPPS, INITIAL VISIT: HCPCS | Performed by: STUDENT IN AN ORGANIZED HEALTH CARE EDUCATION/TRAINING PROGRAM

## 2023-01-30 ASSESSMENT — PATIENT HEALTH QUESTIONNAIRE - PHQ9
SUM OF ALL RESPONSES TO PHQ QUESTIONS 1-9: 4
5. POOR APPETITE OR OVEREATING: 0
2. FEELING DOWN, DEPRESSED OR HOPELESS: 1
10. IF YOU CHECKED OFF ANY PROBLEMS, HOW DIFFICULT HAVE THESE PROBLEMS MADE IT FOR YOU TO DO YOUR WORK, TAKE CARE OF THINGS AT HOME, OR GET ALONG WITH OTHER PEOPLE: 0
SUM OF ALL RESPONSES TO PHQ9 QUESTIONS 1 & 2: 1
1. LITTLE INTEREST OR PLEASURE IN DOING THINGS: 0
SUM OF ALL RESPONSES TO PHQ QUESTIONS 1-9: 4
8. MOVING OR SPEAKING SO SLOWLY THAT OTHER PEOPLE COULD HAVE NOTICED. OR THE OPPOSITE, BEING SO FIGETY OR RESTLESS THAT YOU HAVE BEEN MOVING AROUND A LOT MORE THAN USUAL: 0
9. THOUGHTS THAT YOU WOULD BE BETTER OFF DEAD, OR OF HURTING YOURSELF: 0
SUM OF ALL RESPONSES TO PHQ QUESTIONS 1-9: 4
SUM OF ALL RESPONSES TO PHQ QUESTIONS 1-9: 4
4. FEELING TIRED OR HAVING LITTLE ENERGY: 1
3. TROUBLE FALLING OR STAYING ASLEEP: 1
6. FEELING BAD ABOUT YOURSELF - OR THAT YOU ARE A FAILURE OR HAVE LET YOURSELF OR YOUR FAMILY DOWN: 0
7. TROUBLE CONCENTRATING ON THINGS, SUCH AS READING THE NEWSPAPER OR WATCHING TELEVISION: 1

## 2023-01-30 ASSESSMENT — LIFESTYLE VARIABLES
HOW OFTEN DO YOU HAVE A DRINK CONTAINING ALCOHOL: NEVER
HOW MANY STANDARD DRINKS CONTAINING ALCOHOL DO YOU HAVE ON A TYPICAL DAY: PATIENT DOES NOT DRINK

## 2023-01-30 NOTE — PROGRESS NOTES
Medicare Annual Wellness Visit    Anthony Goldman is here for Medicare AWV    Assessment & Plan    Recommendations for Preventive Services Due: see orders and patient instructions/AVS.  Recommended screening schedule for the next 5-10 years is provided to the patient in written form: see Patient Instructions/AVS.     No follow-ups on file. Subjective       Patient's complete Health Risk Assessment and screening values have been reviewed and are found in Flowsheets. The following problems were reviewed today and where indicated follow up appointments were made and/or referrals ordered. Positive Risk Factor Screenings with Interventions:    Fall Risk:  Do you feel unsteady or are you worried about falling? : no  2 or more falls in past year?: (!) yes  Fall with injury in past year?: (!) yes     Interventions:    See AVS for additional education material            General HRA Questions:  Select all that apply: (!) Anger    Anger Interventions:  Patient feeling frustrated. Is followed in office and had recent OV. Objective      Patient-Reported Vitals  Patient-Reported Weight: 163 lb  Patient-Reported Height: 66in            No Known Allergies  Prior to Visit Medications    Medication Sig Taking?  Authorizing Provider   aspirin 81 MG EC tablet Take 81 mg by mouth daily Yes Historical Provider, MD   meloxicam (MOBIC) 15 MG tablet Take 1 tablet by mouth daily Yes Everardo Billings DO   pantoprazole (PROTONIX) 40 MG tablet TAKE 1 TABLET BY MOUTH EVERY DAY Yes Historical Provider, MD   Multiple Vitamins-Iron (MULTI-VITAMIN/IRON PO) Take by mouth Yes Historical Provider, MD   rosuvastatin (CRESTOR) 20 MG tablet Take 1 tablet by mouth at bedtime Yes Majo Drew MD   losartan (COZAAR) 50 MG tablet Take 50 mg by mouth daily Yes Historical Provider, MD   vitamin D3 (CHOLECALCIFEROL) 25 MCG (1000 UT) TABS tablet Take 1,000 Units by mouth daily Yes Historical Provider, MD   B Complex Vitamins (B COMPLEX 1 PO) Take 1 tablet by mouth daily Yes Historical Provider, MD   cyclobenzaprine (FLEXERIL) 10 MG tablet Take 10 mg by mouth daily as needed for Muscle spasms Yes Historical Provider, MD   furosemide (LASIX) 20 MG tablet Take 20 mg by mouth every other day Yes Historical Provider, MD Jensen (Including outside providers/suppliers regularly involved in providing care):   Patient Care Team:  Ghada Simons DO as PCP - General (Family Medicine)  Ghada Simons DO as PCP - [facility] Empaneled Provider     Reviewed and updated this visit:  Allergies  Meds  Med Hx  Surg Hx  Soc Hx  Fam Hx        I, [address] [facility], LPN, 3/58/5481, performed the documented evaluation under the direct supervision of the attending physician. Anthony Rosenbaum, was evaluated through a synchronous (real-time) audio-video encounter. The patient (or guardian if applicable) is aware that this is a billable service, which includes applicable co-pays. This Virtual Visit was conducted with patient's (and/or legal guardian's) consent. The visit was conducted pursuant to the emergency declaration under the [address] [facility], [address] [place] [address] [facility] authority and the [employer] and [employer] General Act. Patient identification was verified, and a caregiver was present when appropriate. The patient was located at Home: [address] [facility]. [address]. Provider was located at [address] [address] ([address] [place] [address]): [address] [place] [address],  [address]. This encounter was performed under myGhada DOs, direct supervision, 1/30/2023.

## 2023-01-30 NOTE — PATIENT INSTRUCTIONS
[facility]  Medicare offers a range of preventive health benefits. Some of the tests and screenings are paid in full while other may be subject to a deductible, co-insurance, and/or copay. Some of these benefits include a comprehensive review of your medical history including lifestyle, illnesses that may run in your family, and various assessments and screenings as appropriate. After reviewing your medical record and screening and assessments performed today your provider may have ordered immunizations, labs, imaging, and/or referrals for you. A list of these orders (if applicable) as well as your Preventive Care list are included within your After Visit Summary for your review. Other Preventive Recommendations:    A preventive eye exam performed by an eye specialist is recommended every 1-2 years to screen for glaucoma; cataracts, macular degeneration, and other eye disorders. A preventive dental visit is recommended every 6 months. Try to get at least 150 minutes of exercise per week or 10,000 steps per day on a pedometer . Order or download the FREE \"Exercise & Physical Activity: Your Everyday Guide\" from [employer]. Call 5-792.189.8033 or search [employer] online. You need 2023-8973 mg of calcium and 9879-4126 IU of vitamin D per day. It is possible to meet your calcium requirement with diet alone, but a vitamin D supplement is usually necessary to meet this goal.  When exposed to the sun, use a sunscreen that protects against both UVA and UVB radiation with an SPF of 30 or greater. Reapply every 2 to 3 hours or after sweating, drying off with a towel, or swimming. Always wear a seat belt when traveling in a car. Always wear a helmet when riding a bicycle or motorcycle. Heart-Healthy Diet   Sodium, Fat, and Cholesterol Controlled Diet       What Is a Heart Healthy Diet?    A heart-healthy diet is one that limits sodium , certain types of fat , and cholesterol . This type of diet is recommended for:   People with any form of cardiovascular disease (eg, coronary heart disease , peripheral vascular disease , previous heart attack , previous stroke )   People with risk factors for cardiovascular disease, such as high blood pressure , high cholesterol , or diabetes   Anyone who wants to lower their risk of developing cardiovascular disease   Sodium    Sodium is a mineral found in many foods. In general, most people consume much more sodium than they need. Diets high in sodium can increase blood pressure and lead to edema (water retention). On a heart-healthy diet, you should consume no more than 2,300 mg (milligrams) of sodium per dayabout the amount in one teaspoon of table salt. The foods highest in sodium include table salt (about 50% sodium), processed foods, convenience foods, and preserved foods. Cholesterol    Cholesterol is a fat-like, waxy substance in your blood. Our bodies make some cholesterol. It is also found in animal products, with the highest amounts in fatty meat, egg yolks, whole milk, cheese, shellfish, and organ meats. On a heart-healthy diet, you should limit your cholesterol intake to less than 200 mg per day. It is normal and important to have some cholesterol in your bloodstream. But too much cholesterol can cause plaque to build up within your arteries, which can eventually lead to a heart attack or stroke. The two types of cholesterol that are most commonly referred to are:   Low-density lipoprotein (LDL) cholesterol  Also known as bad cholesterol, this is the cholesterol that tends to build up along your arteries. Bad cholesterol levels are increased by eating fats that are saturated or hydrogenated. Optimal level of this cholesterol is less than 100. Over 130 starts to get risky for heart disease.    High-density lipoprotein (HDL) cholesterol  Also known as good cholesterol, this type of cholesterol actually carries cholesterol away from your arteries and may, therefore, help lower your risk of having a heart attack. You want this level to be high (ideally greater than 60). It is a risk to have a level less than 40. You can raise this good cholesterol by eating olive oil, canola oil, avocados, or nuts. Exercise raises this level, too. Fat    Fat is calorie dense and packs a lot of calories into a small amount of food. Even though fats should be limited due to their high calorie content, not all fats are bad. In fact, some fats are quite healthful. Fat can be broken down into four main types. The good-for-you fats are:   Monounsaturated fat  found in oils such as olive and canola, avocados, and nuts and natural nut butters; can decrease cholesterol levels, while keeping levels of HDL cholesterol high   Polyunsaturated fat  found in oils such as safflower, sunflower, soybean, corn, and sesame; can decrease total cholesterol and LDL cholesterol   Omega-3 fatty acids  particularly those found in fatty fish (such as salmon, trout, tuna, mackerel, herring, and sardines); can decrease risk of arrhythmias, decrease triglyceride levels, and slightly lower blood pressure   The fats that you want to limit are:   Saturated fat  found in animal products, many fast foods, and a few vegetables; increases total blood cholesterol, including LDL levels   Animal fats that are saturated include: butter, lard, whole-milk dairy products, meat fat, and poultry skin   Vegetable fats that are saturated include: hydrogenated shortening, palm oil, coconut oil, cocoa butter   Hydrogenated or trans fat  found in margarine and vegetable shortening, most shelf stable snack foods, and fried foods; increases LDL and decreases HDL     It is generally recommended that you limit your total fat for the day to less than 30% of your total calories.  If you follow an 1800-calorie heart healthy diet, for example, this would mean 60 grams of fat or less per day. Saturated fat and trans fat in your diet raises your blood cholesterol the most, much more than dietary cholesterol does. For this reason, on a heart-healthy diet, less than 7% of your calories should come from saturated fat and ideally 0% from trans fat. On an 1800-calorie diet, this translates into less than 14 grams of saturated fat per day, leaving 46 grams of fat to come from mono- and polyunsaturated fats.    Food Choices on a Heart Healthy Diet   Food Category   Foods Recommended   Foods to Avoid   Grains   Breads and rolls without salted tops Most dry and cooked cereals Unsalted crackers and breadsticks Low-sodium or homemade breadcrumbs or stuffing All rice and pastas   Breads, rolls, and crackers with salted tops High-fat baked goods (eg, muffins, donuts, pastries) Quick breads, self-rising flour, and biscuit mixes Regular bread crumbs Instant hot cereals Commercially prepared rice, pasta, or stuffing mixes   Vegetables   Most fresh, frozen, and low-sodium canned vegetables Low-sodium and salt-free vegetable juices Canned vegetables if unsalted or rinsed   Regular canned vegetables and juices, including sauerkraut and pickled vegetables Frozen vegetables with sauces Commercially prepared potato and vegetable mixes   Fruits   Most fresh, frozen, and canned fruits All fruit juices   Fruits processed with salt or sodium   Milk   Nonfat or low-fat (1%) milk Nonfat or low-fat yogurt Cottage cheese, low-fat ricotta, cheeses labeled as low-fat and low-sodium   Whole milk Reduced-fat (2%) milk Malted and chocolate milk Full fat yogurt Most cheeses (unless low-fat and low salt) Buttermilk (no more than 1 cup per week)   Meats and Beans   Lean cuts of fresh or frozen beef, veal, lamb, or pork (look for the word loin) Fresh or frozen poultry without the skin Fresh or frozen fish and some shellfish Egg whites and egg substitutes (Limit whole eggs to three per week) Tofu Nuts or seeds (unsalted, dry-roasted), low-sodium peanut butter Dried peas, beans, and lentils   Any smoked, cured, salted, or canned meat, fish, or poultry (including contreras, chipped beef, cold cuts, hot dogs, sausages, sardines, and anchovies) Poultry skins Breaded and/or fried fish or meats Canned peas, beans, and lentils Salted nuts   Fats and Oils   Olive oil and canola oil Low-sodium, low-fat salad dressings and mayonnaise   Butter, margarine, coconut and palm oils, contreras fat   Snacks, Sweets, and Condiments   Low-sodium or unsalted versions of broths, soups, soy sauce, and condiments Pepper, herbs, and spices; vinegar, lemon, or lime juice Low-fat frozen desserts (yogurt, sherbet, fruit bars) Sugar, cocoa powder, honey, syrup, jam, and preserves Low-fat, trans-fat free cookies, cakes, and pies Ronnie and animal crackers, fig bars, preeti snaps   High-fat desserts Broth, soups, gravies, and sauces, made from instant mixes or other high-sodium ingredients Salted snack foods Canned olives Meat tenderizers, seasoning salt, and most flavored vinegars   Beverages   Low-sodium carbonated beverages Tea and coffee in moderation Soy milk   Commercially softened water   Suggestions   Make whole grains, fruits, and vegetables the base of your diet. Choose heart-healthy fats such as canola, olive, and flaxseed oil, and foods high in heart-healthy fats, such as nuts, seeds, soybeans, tofu, and fish. Eat fish at least twice per week; the fish highest in omega-3 fatty acids and lowest in mercury include salmon, herring, mackerel, sardines, and canned chunk light tuna. If you eat fish less than twice per week or have high triglycerides, talk to your doctor about taking fish oil supplements. Read food labels. For products low in fat and cholesterol, look for fat free, low-fat, cholesterol free, saturated fat free, and trans fat freeAlso scan the Nutrition Facts Label, which lists saturated fat, trans fat, and cholesterol amounts. For products low in sodium, look for sodium free, very low sodium, low sodium, no added salt, and unsalted   Skip the salt when cooking or at the table; if food needs more flavor, get creative and try out different herbs and spices. Garlic and onion also add substantial flavor to foods. Trim any visible fat off meat and poultry before cooking, and drain the fat off after spring. Use cooking methods that require little or no added fat, such as grilling, boiling, baking, poaching, broiling, roasting, steaming, stir-frying, and sauting. Avoid fast food and convenience food. They tend to be high in saturated and trans fat and have a lot of added salt. Talk to a registered dietitian for individualized diet advice. Last Reviewed: March 2011 Pat Green MS, MPH, RD   Updated: 3/29/2011     Keeping Home a [address] [facility]       As we get older, changes in balance, gait, strength, vision, hearing, and cognition make even the most youthful senior more prone to accidents. Falls are one of the leading health risks for older people. This increased risk of falling is related to:   Aging process (eg, decreased muscle strength, slowed reflexes)   Higher incidence of chronic health problems (eg, arthritis, diabetes) that may limit mobility, agility or sensory awareness   Side effects of medicine (eg, dizziness, blurred vision)especially medicines like prescription pain medicines and drugs used to treat mental health conditions   Depending on the brittleness of your bones, the consequences of a fall can be serious and long lasting. Home Life   Research by the Association of Aging [facility]) shows that some home accidents among older adults can be prevented by making simple lifestyle changes and basic modifications and repairs to the home environment. Here are some lifestyle changes that experts recommend:   Have your hearing and vision checked regularly. Be sure to wear prescription glasses that are right for you. Speak to your doctor or pharmacist about the possible side effects of your medicines. A number of medicines can cause dizziness. If you have problems with sleep, talk to your doctor. Limit your intake of alcohol. If necessary, use a cane or walker to help maintain your balance. Wear supportive, rubber-soled shoes, even at home. If you live in a region that gets wintry weather, you may want to put special cleats on your shoes to prevent you from slipping on the snow and ice. Exercise regularly to help maintain muscle tone, agility, and balance. Always hold the banister when going up or down stairs. Also, use  bars when getting in or out of the bath or shower, or using the toilet. To avoid dizziness, get up slowly from a lying down position. Sit up first, dangling your legs for a minute or two before rising to a standing position. Overall Home Safety Check   According to the Consumer [employer] Safety Commision's \"Older Consumer Home Safety Checklist,\" it is important to check for potential hazards in each room. And remember, proper lighting is an essential factor in home safety. If you cannot see clearly, you are more likely to fall. Important questions to ask yourself include:   Are lamp, electric, extension, and telephone cords placed out of the flow of traffic and maintained in good condition? Have frayed cords been replaced? Are all small rugs and runners slip resistant? If not, you can secure them to the floor with a special double-sided carpet tape. Are smoke detectors properly locatedone on every floor of your home and one outside of every sleeping area? Are they in good working order? Are batteries replaced at least once a year? Do you have a well-maintained carbon monoxide detector outside every sleeping are in your home? Does your furniture layout leave plenty of space to maneuver between and around chairs, tables, beds, and sofas?    Are hallways, stairs and passages between rooms well lit? Can you reach a lamp without getting out of bed? Are floor surfaces well maintained? Shag rugs, high-pile carpeting, tile floors, and polished wood floors can be particularly slippery. Stairs should always have handrails and be carpeted or fitted with a non-skid tread. Is your telephone easily reachable. Is the cord safely tucked away? Room by Room   According to the Association of Aging, bathrooms and marlene are the two most potentially hazardous rooms in your home. In the Kitchen    Be sure your stove is in proper working order and always make sure burners and the oven are off before you go out or go to sleep. Keep pots on the back burners, turn handles away from the front of the stove, and keep stove clean and free of grease build-up. Kitchen ventilation systems and range exhausts should be working properly. Keep flammable objects such as towels and pot holders away from the cooking area except when in use. Make sure kitchen curtains are tied back. Move cords and appliances away from the sink and hot surfaces. If extension cords are needed, install wiring guides so they do not hang over the sink, range, or working areas. Look for coffee pots, kettles and toaster ovens with automatic shut-offs. Keep a mop handy in the kitchen so you can wipe up spills instantly. You should also have a small fire extinguisher. Arrange your kitchen with frequently used items on lower shelves to avoid the need to stand on a stepstool to reach them. Make sure countertops are well-lit to avoid injuries while cutting and preparing food. In the Bathroom    Use a non-slip mat or decals in the tub and shower, since wet, soapy tile or porcelain surfaces are extremely slippery. Make sure bathroom rugs are non-skid or tape them firmly to the floor. Bathtubs should have at least one, preferably two, grab bars, firmly attached to structural supports in the wall.  (Do not use built-in soap holders or glass shower doors as grab bars.)    Tub seats fitted with non-slip material on the legs allow you to wash sitting down. For people with limited mobility, bathtub transfer benches allow you to slide safely into the tub. Raised toilet seats and toilet safety rails are helpful for those with knee or hip problems. In the [facility]    Make sure you use a nightlight and that the area around your bed is clear of potential obstacles. Be careful with electric blankets and never go to sleep with a heating pad, which can cause serious burns even if on a low setting. Use fire-resistant mattress covers and pillows, and NEVER smoke in bed. Keep a phone next to the bed that is programmed to dial 911 at the push of a button. If you have a chronic condition, you may want to sign on with an automatic call-in service. Typically the system includes a small pendant that connects directly to an emergency medical voice-response system. You should also make arrangements to stay in contact with someonefriend, neighbor, family memberon a regular schedule. Fire Prevention   According to the [employer]. (Smoke Alarms for Every) [address] [place] [address], senior citizens are one of the two highest risk groups for death and serious injuries due to residential fires. When cooking, wear short-sleeved items, never a bulky long-sleeved robe. The [facility]'s Safety Checklist for Older Consumers emphasizes the importance of checking basements, garages, workshops and storage areas for fire hazards, such as volatile liquids, piles of old rags or clothing and overloaded circuits. Never smoke in bed or when lying down on a couch or recliner chair. Small portable electric or kerosene heaters are responsible for many home fires and should be used cautiously if at all. If you do use one, be sure to keep them away from flammable materials. In case of fire, make sure you have a pre-established emergency exit plan.     Have a professional check your fireplace and other fuel-burning appliances yearly. [employer]   Baby boomers entering the velasquez years will continue to see the development of new products to help older adults live safely and independently in spite of age-related changes. Making Life More Livable  , by Juanito Robbins, lists over 1,000 products for \"living well in the mature years,\" such as bathing and mobility aids, household security devices, ergonomically designed knives and peelers, and faucet valves and knobs for temperature control. Medical supply stores and organizations are good sources of information about products that improve your quality of life and insure your safety. Last Reviewed: November 2009 Alonso Lea MD   Updated: 3/7/2011            Preventing Falls: Care Instructions  Overview     Getting around your home safely can be a challenge if you have injuries or health problems that make it easy for you to fall. Loose rugs and furniture in walkways are among the dangers for many older people who have problems walking or who have poor eyesight. People who have conditions such as arthritis, osteoporosis, or dementia also have to be careful not to fall. You can make your home safer with a few simple measures. Follow-up care is a key part of your treatment and safety. Be sure to make and go to all appointments, and call your doctor if you are having problems. It's also a good idea to know your test results and keep a list of the medicines you take. How can you care for yourself at home? Taking care of yourself  Exercise regularly to improve your strength, muscle tone, and balance. Walk if you can. Swimming may be a good choice if you cannot walk easily. Have your vision and hearing checked each year or any time you notice a change. If you have trouble seeing and hearing, you might not be able to avoid objects and could lose your balance. Know the side effects of the medicines you take.  Ask your doctor or pharmacist whether the medicines you take can affect your balance. Sleeping pills or sedatives can affect your balance. Limit the amount of alcohol you drink. Alcohol can impair your balance and other senses. Ask your doctor whether calluses or corns on your feet need to be removed. If you wear loose-fitting shoes because of calluses or corns, you can lose your balance and fall. Talk to your doctor if you have numbness in your feet. You may get dizzy if you do not drink enough water. To prevent dehydration, drink plenty of fluids. Choose water and other clear liquids. If you have kidney, heart, or liver disease and have to limit fluids, talk with your doctor before you increase the amount of fluids you drink. Preventing falls at home  Remove raised doorway thresholds, throw rugs, and clutter. Repair loose carpet or raised areas in the floor. Move furniture and electrical cords to keep them out of walking paths. Use nonskid floor wax, and wipe up spills right away, especially on ceramic tile floors. If you use a walker or cane, put rubber tips on it. If you use crutches, clean the bottoms of them regularly with an abrasive pad, such as steel wool. Keep your house well lit, especially stairways, porches, and outside walkways. Use night-lights in areas such as hallways and bathrooms. Add extra light switches or use remote switches (such as switches that go on or off when you clap your hands) to make it easier to turn lights on if you have to get up during the night. Install sturdy handrails on stairways. Move items in your cabinets so that the things you use a lot are on the lower shelves (about waist level). Keep a cordless phone and a flashlight with new batteries by your bed. If possible, put a phone in each of the main rooms of your house, or carry a cell phone in case you fall and cannot reach a phone.  Or, you can wear a device around your neck or wrist. You push a button that sends a signal for help. Wear low-heeled shoes that fit well and give your feet good support. Use footwear with nonskid soles. Check the heels and soles of your shoes for wear. Repair or replace worn heels or soles. Do not wear socks without shoes on smooth floors, such as wood. Walk on the grass when the sidewalks are slippery. If you live in an area that gets snow and ice in the winter, sprinkle salt on slippery steps and sidewalks. Or ask a family member or friend to do this for you. Preventing falls in the bath  Install grab bars and nonskid mats inside and outside your shower or tub and near the toilet and sinks. Use shower chairs and bath benches. Use a hand-held shower head that will allow you to sit while showering. Get into a tub or shower by putting the weaker leg in first. Get out of a tub or shower with your strong side first.  Repair loose toilet seats and consider installing a raised toilet seat to make getting on and off the toilet easier. Keep your bathroom door unlocked while you are in the shower. Where can you learn more? Go to http://www.roberts.com/ and enter G117 to learn more about \"Preventing Falls: Care Instructions. \"  Current as of: May 4, 2022               Content Version: 13.5  © 4427-4223 [employer], [employer]. Care instructions adapted under license by [facility] ([facility]). If you have questions about a medical condition or this instruction, always ask your healthcare professional. [place] [address] any warranty or liability for your use of this information. Learning About Managing Anger  What causes anger? Many things can cause anger: Stress at work or at home. Social situations that make you angry. A response to everyday events. Anger signals your body to prepare for a fight. This reaction is often called \"fight or flight. \" When you get angry, adrenaline and other hormones are released into your blood.  Then your blood pressure goes up, your heart beats faster, and you breathe faster. When you express anger in a healthy way, it can inspire change and make you productive. But if you don't have the skills to express anger in a healthy way, anger can build up. You may hurt others--and yourself--emotionally and even physically. Violent behavior often starts with verbal threats or fairly minor incidents. But over time, it can involve physical harm. It can include physical, verbal, or sexual abuse of an intimate partner (domestic violence), a child (child abuse), or an older adult (elder abuse). It can also make you sick. Anger and constant hostility keep your blood pressure high. They increase your chances of having another health problem, such as depression, a heart attack, or a stroke. Some people with post-traumatic stress disorder (PTSD) feel angry and on alert all the time. It may feel like there are no other ways to react when you are angry. But when you learn to work with anger in appropriate and healthy ways, your anger no longer controls you. How can you manage your anger? The first step to managing anger is to be more aware of it. Note the thoughts, feelings, and emotions that you have when you get angry. Practice noticing these signs of anger when you are calm. If you are more aware of the signs of anger, you can take steps to manage it. Here are a few tips: Think before you act. Take time to stop and cool down when you feel yourself getting angry. Count to 10 while you take slow, steady breaths. Practice some other form of mental relaxation. Learn the feelings that lead to angry outbursts. Anger and hostility may be a symptom of unhappy feelings or depression about your job, your relationship, or other aspects of your personal life. Avoid situations that lead to angry outbursts. If standing in line bothers you, do errands at less busy times. Express anger in a healthy way. You might:  Go for a short walk or jog.   Draw, paint, or listen to music to release the anger. Write in a daily journal.  Use \"I\" statements, not \"you\" statements, to discuss your anger. Say \"I don't feel valued when my needs are not being met\" instead of \"You make me mad when you are so inconsiderate. \"  Take care of yourself. Exercise regularly. Eat a variety of healthy foods. Don't skip meals. Try to get 8 hours of sleep each night. Limit your use of alcohol, and don't use drugs. Practice yoga, meditation, or mario chi to relax. Explore other resources that may be available through your job or your community. Contact your human resources department at work. You might be able to get services through an employee assistance program.  Contact your local hospital, mental health facility, or health department. Ask what types of programs or support groups are available in your area. Do not keep guns in your home. If you must have guns in your home, unload them and lock them up. Lock ammunition in a separate place. Keep guns away from children. Where can you find help? If anger or stress starts to harm your work or personal relationships, you might seek help. You can learn ways to manage your feelings and actions. Talk to someone you trust, or find a counselor. There are groups in your area that can connect you with people to talk to. Behavioral Health Treatment Services . This service from the national Substance Abuse and [place] [address] can help you find local counselors. Search online at [employer]. samhsa.gov or call 7-166-983-HELP (681 621 356), or [place] 7-783.178.1046. Parents Anonymous. Self-help groups that serve parents under stress, as well as children who have been abused, are available throughout the [place], [place] ([facility]), and [facility]. To find a group in your area, search online or in your phone book under Parents Anonymous or call (806) 864-6053. Where can you learn more?   Go to http://www.roberts.com/ and enter Z357 to learn more about \"Learning About Managing Anger. \"  Current as of: February 9, 2022               Content Version: 13.5  © 2006-2022 [employer]. Care instructions adapted under license by [facility] ([facility]). If you have questions about a medical condition or this instruction, always ask your healthcare professional. [facility] any warranty or liability for your use of this information. Hearing Loss: Care Instructions  Overview     Hearing loss is a sudden or slow decrease in how well you hear. It can range from mild to severe. Permanent hearing loss can occur with aging. It also can happen when you are exposed long-term to loud noise. Examples include listening to loud music, riding motorcycles, or being around other loud machines. Hearing loss can affect your work and home life. It can make you feel lonely or depressed. You may feel that you have lost your independence. But hearing aids and other devices can help you hear better and feel connected to others. Follow-up care is a key part of your treatment and safety. Be sure to make and go to all appointments, and call your doctor if you are having problems. It's also a good idea to know your test results and keep a list of the medicines you take. How can you care for yourself at home? Avoid loud noises whenever possible. This helps keep your hearing from getting worse. Always wear hearing protection around loud noises. Wear a hearing aid as directed. See a professional who can help you pick a hearing aid that fits you. Have hearing tests as your doctor suggests. They can show whether your hearing has changed. Your hearing aid may need to be adjusted. Use other devices as needed. These may include:  Telephone amplifiers and hearing aids that can connect to a television, stereo, radio, or microphone. Devices that use lights or vibrations. These alert you to the doorbell, a ringing telephone, or a baby monitor.   Television closed-captioning. This shows the words at the bottom of the screen. Most new TVs can do this. TTY (text telephone). This lets you type messages back and forth on the telephone instead of talking or listening. These devices are also called TDD. When messages are typed on the keyboard, they are sent over the phone line to a receiving TTY. The message is shown on a monitor. Use text messaging, social media, and email if it is hard for you to communicate by telephone. Try to learn a listening technique called speechreading. It is not lipreading. You pay attention to people's gestures, expressions, posture, and tone of voice. These clues can help you understand what a person is saying. Face the person you are talking to, and have them face you. Make sure the lighting is good. You need to see the other person's face clearly. Think about counseling if you need help to adjust to your hearing loss. When should you call for help? Watch closely for changes in your health, and be sure to contact your doctor if:    You think your hearing is getting worse.     You have new symptoms, such as dizziness or nausea. Where can you learn more? Go to http://www.roberts.com/ and enter R798 to learn more about \"Hearing Loss: Care Instructions. \"  Current as of: May 4, 2022               Content Version: 13.5  © 9363-8422 Health[employer]. Care instructions adapted under license by [facility] ([facility]). If you have questions about a medical condition or this instruction, always ask your healthcare professional. [place] [address] any warranty or liability for your use of this information. Advance Directives: Care Instructions  Overview  An advance directive is a legal way to state your wishes at the end of your life. It tells your family and your doctor what to do if you can't say what you want. There are two main types of advance directives.  You can change them any time your wishes change. Living will. This form tells your family and your doctor your wishes about life support and other treatment. The form is also called a declaration. Medical power of . This form lets you name a person to make treatment decisions for you when you can't speak for yourself. This person is called a health care agent (health care proxy, health care surrogate). The form is also called a durable power of  for health care. If you do not have an advance directive, decisions about your medical care may be made by a family member, or by a doctor or a  who doesn't know you. It may help to think of an advance directive as a gift to the people who care for you. If you have one, they won't have to make tough decisions by themselves. For more information, including forms for your state, see the [address] website (www.caringinfo.org/planning/advance-directives/). Follow-up care is a key part of your treatment and safety. Be sure to make and go to all appointments, and call your doctor if you are having problems. It's also a good idea to know your test results and keep a list of the medicines you take. What should you include in an advance directive? Many states have a unique advance directive form. (It may ask you to address specific issues.) Or you might use a universal form that's approved by many states. If your form doesn't tell you what to address, it may be hard to know what to include in your advance directive. Use the questions below to help you get started. Who do you want to make decisions about your medical care if you are not able to? What life-support measures do you want if you have a serious illness that gets worse over time or can't be cured? What are you most afraid of that might happen? (Maybe you're afraid of having pain, losing your independence, or being kept alive by machines.)  Where would you prefer to die? (Your home?  A hospital? A nursing home?)  Do you want to donate your organs when you die? Do you want certain Jewish practices performed before you die? When should you call for help? Be sure to contact your doctor if you have any questions. Where can you learn more? Go to http://www.roberts.com/ and enter R264 to learn more about \"Advance Directives: Care Instructions. \"  Current as of: June 16, 2022               Content Version: 13.5  © 8415-1114 [employer]. Care instructions adapted under license by [facility] ([facility]). If you have questions about a medical condition or this instruction, always ask your healthcare professional. [place] [address] any warranty or liability for your use of this information. Personalized Preventive Plan for Anthony Goldman - 1/30/2023  Medicare offers a range of preventive health benefits. Some of the tests and screenings are paid in full while other may be subject to a deductible, co-insurance, and/or copay. Some of these benefits include a comprehensive review of your medical history including lifestyle, illnesses that may run in your family, and various assessments and screenings as appropriate. After reviewing your medical record and screening and assessments performed today your provider may have ordered immunizations, labs, imaging, and/or referrals for you. A list of these orders (if applicable) as well as your Preventive Care list are included within your After Visit Summary for your review. Other Preventive Recommendations:    A preventive eye exam performed by an eye specialist is recommended every 1-2 years to screen for glaucoma; cataracts, macular degeneration, and other eye disorders. A preventive dental visit is recommended every 6 months. Try to get at least 150 minutes of exercise per week or 10,000 steps per day on a pedometer . Order or download the FREE \"Exercise & Physical Activity: Your Everyday Guide\" from The [employer] A[employer].  Call 1-220.127.6501 or search The [employer] on [address] [place] [address]. You need 5952-7766 mg of calcium and 2667-7575 IU of vitamin D per day. It is possible to meet your calcium requirement with diet alone, but a vitamin D supplement is usually necessary to meet this goal.  When exposed to the sun, use a sunscreen that protects against both UVA and UVB radiation with an SPF of 30 or greater. Reapply every 2 to 3 hours or after sweating, drying off with a towel, or swimming. Always wear a seat belt when traveling in a car. Always wear a helmet when riding a bicycle or motorcycle.

## 2023-02-01 ASSESSMENT — ENCOUNTER SYMPTOMS
CONSTIPATION: 0
ABDOMINAL PAIN: 0
VOMITING: 0
SHORTNESS OF BREATH: 0
DIARRHEA: 0
COUGH: 0
BLOOD IN STOOL: 0
EYE PAIN: 0
NAUSEA: 0
TROUBLE SWALLOWING: 0

## 2023-03-27 DIAGNOSIS — M54.50 CHRONIC BILATERAL LOW BACK PAIN, UNSPECIFIED WHETHER SCIATICA PRESENT: ICD-10-CM

## 2023-03-27 DIAGNOSIS — G89.29 CHRONIC BILATERAL LOW BACK PAIN, UNSPECIFIED WHETHER SCIATICA PRESENT: ICD-10-CM

## 2023-03-27 RX ORDER — MELOXICAM 15 MG/1
TABLET ORAL
Qty: 30 TABLET | Refills: 1 | Status: SHIPPED | OUTPATIENT
Start: 2023-03-27

## 2023-03-27 NOTE — TELEPHONE ENCOUNTER
Micah Teague called requesting a refill of the below medication which has been pended for you:     Requested Prescriptions     Pending Prescriptions Disp Refills    meloxicam (MOBIC) 15 MG tablet [Pharmacy Med Name: MELOXICAM 15 MG TABLET] 30 tablet 1     Sig: TAKE 1 TABLET BY MOUTH EVERY DAY       Last Appointment Date: 1/30/2023  Next Appointment Date: 7/24/2023    No Known Allergies

## 2023-05-08 ENCOUNTER — OFFICE VISIT (OUTPATIENT)
Dept: UROLOGY | Age: 73
End: 2023-05-08
Payer: MEDICARE

## 2023-05-08 VITALS
RESPIRATION RATE: 12 BRPM | HEART RATE: 72 BPM | DIASTOLIC BLOOD PRESSURE: 70 MMHG | HEIGHT: 66 IN | OXYGEN SATURATION: 98 % | WEIGHT: 156 LBS | BODY MASS INDEX: 25.07 KG/M2 | SYSTOLIC BLOOD PRESSURE: 122 MMHG

## 2023-05-08 DIAGNOSIS — N40.1 BENIGN LOCALIZED PROSTATIC HYPERPLASIA WITH LOWER URINARY TRACT SYMPTOMS (LUTS): ICD-10-CM

## 2023-05-08 DIAGNOSIS — N52.9 ERECTILE DYSFUNCTION, UNSPECIFIED ERECTILE DYSFUNCTION TYPE: ICD-10-CM

## 2023-05-08 DIAGNOSIS — R97.20 ELEVATED PSA: Primary | ICD-10-CM

## 2023-05-08 PROCEDURE — 99204 OFFICE O/P NEW MOD 45 MIN: CPT | Performed by: UROLOGY

## 2023-05-08 PROCEDURE — 3078F DIAST BP <80 MM HG: CPT | Performed by: UROLOGY

## 2023-05-08 PROCEDURE — 99212 OFFICE O/P EST SF 10 MIN: CPT

## 2023-05-08 PROCEDURE — 3074F SYST BP LT 130 MM HG: CPT | Performed by: UROLOGY

## 2023-05-08 PROCEDURE — 99212 OFFICE O/P EST SF 10 MIN: CPT | Performed by: UROLOGY

## 2023-05-08 PROCEDURE — 1123F ACP DISCUSS/DSCN MKR DOCD: CPT | Performed by: UROLOGY

## 2023-05-08 RX ORDER — TAMSULOSIN HYDROCHLORIDE 0.4 MG/1
0.4 CAPSULE ORAL DAILY
Qty: 90 CAPSULE | Refills: 3 | Status: SHIPPED | OUTPATIENT
Start: 2023-05-08 | End: 2023-08-06

## 2023-05-08 RX ORDER — SILDENAFIL 100 MG/1
100 TABLET, FILM COATED ORAL DAILY PRN
Qty: 30 TABLET | Refills: 3 | Status: SHIPPED | OUTPATIENT
Start: 2023-05-08

## 2023-05-08 NOTE — PROGRESS NOTES
Aye Hu MD.    [place] [address]  [address]  [address]  [place] [address] [place]  Dept: [phone]  Dept Fax: [facility] Urology Office Note -     Patient:  Darren Martinez  YOB: 1950    The patient is a 74 y.o. male who presents today for evaluation of the following problems:   Chief Complaint   Patient presents with    Benign Prostatic Hypertrophy    Urinary Retention    New Patient    referred/consultation requested by Svetlana Doty DO. History of Present Illness:    Rising PSA  Last check 2/2022    BPH  Was told he had enlarged prostate 15 years ago-- [place] \"[place] [facility] [place] [facility] [place]    ED  Since BPH treatment in past  Was on sildenafil in past    Requested/reviewed records from Svetlana Doty DO office and/or outside physician/EMR    (Patient's old records have been requested, reviewed and pertinent findings summarized in today's note.)    Procedures Today: N/A      Last several PSA's:  Lab Results   Component Value Date    PSA 3.75 02/09/2022    PSA 2.49 08/06/2020       Last total testosterone:  No results found for: TESTOSTERONE    Urinalysis today:  No results found for this visit on 05/08/23. Last BUN and creatinine:  Lab Results   Component Value Date    BUN 13 09/02/2022     Lab Results   Component Value Date    CREATININE 0.72 09/02/2022         Imaging Reviewed during this Office Visit:   Nerissa Velazquez MD independently reviewed the images and verified the radiology reports from:    VL AAA SCREENING    Result Date: 10/17/2022  EXAMINATION: SCREENING ULTRASOUND OF THE AORTA 10/17/2022 9:14 am TECHNIQUE: Ultrasound using B-mode/gray scaled imaging and color flow Doppler was obtained of the aorta.  COMPARISON: None HISTORY: ORDERING SYSTEM PROVIDED HISTORY: Former smoker TECHNOLOGIST PROVIDED HISTORY: Reason for Exam: former smoker Relevant

## 2023-05-26 DIAGNOSIS — M54.50 CHRONIC BILATERAL LOW BACK PAIN, UNSPECIFIED WHETHER SCIATICA PRESENT: ICD-10-CM

## 2023-05-26 DIAGNOSIS — G89.29 CHRONIC BILATERAL LOW BACK PAIN, UNSPECIFIED WHETHER SCIATICA PRESENT: ICD-10-CM

## 2023-05-26 RX ORDER — MELOXICAM 15 MG/1
TABLET ORAL
Qty: 30 TABLET | Refills: 1 | Status: SHIPPED | OUTPATIENT
Start: 2023-05-26

## 2023-06-07 NOTE — ED PROVIDER NOTE - RADIATION
Roverto Jefferson presents today for   Chief Complaint   Patient presents with    Urinary Frequency     Has been having some urine frequency for a few days now        Is someone accompanying this pt? No     Is the patient using any DME equipment during OV? No     Health Maintenance reviewed and discussed and ordered per Provider. Health Maintenance Due   Topic Date Due    HIV screen  Never done    Hepatitis C screen  Never done    DTaP/Tdap/Td vaccine (1 - Tdap) Never done    Colorectal Cancer Screen  Never done    Shingles vaccine (1 of 2) Never done    COVID-19 Vaccine (3 - Booster for Moderna series) 09/01/2021   .          1. \"Have you been to the ER, urgent care clinic since your last visit? Hospitalized since your last visit? \" No    2. \"Have you seen or consulted any other health care providers outside of the [address] [place] [address] since your last visit? \" No     3. For patients aged 39-70: Has the patient had a colonoscopy / FIT/ Cologuard? Yes - Care Gap present. Rooming MA/LPN to request most recent results      If the patient is female:    4. For patients aged 41-77: Has the patient had a mammogram within the past 2 years? Yes - no Care Gap present      5. For patients aged 21-65: Has the patient had a pap smear?  Yes - no Care Gap present macrocrystal-monohydrate, (MACROBID) 100 MG capsule Take 1 capsule by mouth 2 times daily for 5 days     No current facility-administered medications for this visit. /76   Pulse 61   Temp 97.2 °F (36.2 °C)   Resp 16   SpO2 97%            General:  alert, cooperative, well appearing, in no apparent distress. Eyes:  Pupils are equally round and reactive to light with accommodation. CV:  The heart sounds are regular in rate and rhythm. There is a normal S1 and S2. There or no murmurs, rubs, or gallops. Distal pulses are intact and equal.  Lungs: Inspiratory and expiratory efforts are full and unlabored. Lung sounds are clear and equal to auscultation throughout all lung fields without wheezing, rales, or rhonchi. GI:  The abdomen is soft ,mild tenderness to suprapubic area . Extremities: There is no clubbing, cyanosis,   3+ peripheral pulses. cap refill less than 2 seconds the right foot has bruising to the lateral area over the cuboid bone, there is dependent bruising to the toes with not pain or injury to the toes . The foot is swollen. The pt had ROM to the right ankle , and toes , mild discomfort reported to cuboid area with movement of right foot   Neurological:    There is no obvious focal sensory or motor deficits. Strength is 5/5 in the upper and lower extremity bilaterally. An electronic signature was used to authenticate this note.   -- IMER Frey no radiation

## 2023-07-10 ENCOUNTER — OFFICE VISIT (OUTPATIENT)
Dept: UROLOGY | Age: 73
End: 2023-07-10
Payer: MEDICARE

## 2023-07-10 VITALS
HEART RATE: 72 BPM | WEIGHT: 158 LBS | HEIGHT: 66 IN | OXYGEN SATURATION: 98 % | BODY MASS INDEX: 25.39 KG/M2 | RESPIRATION RATE: 12 BRPM | DIASTOLIC BLOOD PRESSURE: 62 MMHG | SYSTOLIC BLOOD PRESSURE: 120 MMHG

## 2023-07-10 DIAGNOSIS — N52.9 ERECTILE DYSFUNCTION, UNSPECIFIED ERECTILE DYSFUNCTION TYPE: ICD-10-CM

## 2023-07-10 DIAGNOSIS — N40.1 BENIGN LOCALIZED PROSTATIC HYPERPLASIA WITH LOWER URINARY TRACT SYMPTOMS (LUTS): Primary | ICD-10-CM

## 2023-07-10 DIAGNOSIS — R97.20 ELEVATED PSA: ICD-10-CM

## 2023-07-10 PROCEDURE — 99214 OFFICE O/P EST MOD 30 MIN: CPT | Performed by: UROLOGY

## 2023-07-10 PROCEDURE — 3074F SYST BP LT 130 MM HG: CPT | Performed by: UROLOGY

## 2023-07-10 PROCEDURE — 3078F DIAST BP <80 MM HG: CPT | Performed by: UROLOGY

## 2023-07-10 PROCEDURE — 99213 OFFICE O/P EST LOW 20 MIN: CPT | Performed by: UROLOGY

## 2023-07-10 PROCEDURE — 1123F ACP DISCUSS/DSCN MKR DOCD: CPT | Performed by: UROLOGY

## 2023-07-10 RX ORDER — TAMSULOSIN HYDROCHLORIDE 0.4 MG/1
0.4 CAPSULE ORAL DAILY
Qty: 90 CAPSULE | Refills: 3 | Status: SHIPPED | OUTPATIENT
Start: 2023-07-10 | End: 2023-10-08

## 2023-07-10 RX ORDER — SILDENAFIL 100 MG/1
100 TABLET, FILM COATED ORAL DAILY PRN
Qty: 30 TABLET | Refills: 3 | Status: SHIPPED | OUTPATIENT
Start: 2023-07-10

## 2023-07-10 NOTE — PROGRESS NOTES
Karen Holliday MD.    [place] [address]  [address] [facility]  [place] [address] [facility]  Dept: [phone]  Dept Fax: [facility] Urology Office Note -     Patient:  Taryn Kolb  YOB: 1950    The patient is a 74 y.o. male who presents today for evaluation of the following problems:   Chief Complaint   Patient presents with    Elevated PSA    referred/consultation requested by Elvin Lyons DO. History of Present Illness:    Rising PSA  Stable 3.5    BPH  Was told he had enlarged prostate 15 years ago-- they \"shrunk prostate\" in [facility] [place] ED  Cont flomax    ED  Since BPH treatment in past  Was on sildenafil in past    Requested/reviewed records from Elvin Lyons DO office and/or outside physician/EMR    (Patient's old records have been requested, reviewed and pertinent findings summarized in today's note.)    Procedures Today: N/A      Last several PSA's:  Lab Results   Component Value Date    PSA 3.75 02/09/2022    PSA 2.49 08/06/2020       Last total testosterone:  No results found for: TESTOSTERONE    Urinalysis today:  No results found for this visit on 07/10/23.     Last BUN and creatinine:  Lab Results   Component Value Date    BUN 13 09/02/2022     Lab Results   Component Value Date    CREATININE 0.72 09/02/2022         Imaging Reviewed during this Office Visit:   Markie Adam MD independently reviewed the images and verified the radiology reports from:      PAST MEDICAL, FAMILY AND SOCIAL HISTORY:  Past Medical History:   Diagnosis Date    Chronic back pain      Past Surgical History:   Procedure Laterality Date    APPENDECTOMY      BRAIN ANEURYSM SURGERY      UNKNOWN BRAIN ANEURYSM CLIP FROM 20+ YEARS AGO, NO INFORMATION, NO MRI - [facility] 9/1/22    CHOLECYSTECTOMY      HERNIA REPAIR      ROTATOR CUFF REPAIR      TONSILLECTOMY       Family History   Problem

## 2023-07-30 DIAGNOSIS — M54.50 CHRONIC BILATERAL LOW BACK PAIN, UNSPECIFIED WHETHER SCIATICA PRESENT: ICD-10-CM

## 2023-07-30 DIAGNOSIS — G89.29 CHRONIC BILATERAL LOW BACK PAIN, UNSPECIFIED WHETHER SCIATICA PRESENT: ICD-10-CM

## 2023-07-31 RX ORDER — MELOXICAM 15 MG/1
15 TABLET ORAL DAILY
Qty: 30 TABLET | Refills: 0 | Status: SHIPPED | OUTPATIENT
Start: 2023-07-31

## 2023-07-31 NOTE — TELEPHONE ENCOUNTER
JS out of office all week.          Terrie Cook called requesting a refill of the below medication which has been pended for you:     Requested Prescriptions     Pending Prescriptions Disp Refills    meloxicam (MOBIC) 15 MG tablet [Pharmacy Med Name: MELOXICAM 15 MG TABLET] 30 tablet 0     Sig: TAKE 1 TABLET BY MOUTH EVERY DAY       Last Appointment Date: Visit date not found  Next Appointment Date: Visit date not found    No Known Allergies

## 2023-08-10 ENCOUNTER — OFFICE VISIT (OUTPATIENT)
Dept: FAMILY MEDICINE CLINIC | Age: 73
End: 2023-08-10
Payer: MEDICARE

## 2023-08-10 VITALS
OXYGEN SATURATION: 96 % | SYSTOLIC BLOOD PRESSURE: 140 MMHG | WEIGHT: 160 LBS | BODY MASS INDEX: 25.71 KG/M2 | HEART RATE: 66 BPM | HEIGHT: 66 IN | DIASTOLIC BLOOD PRESSURE: 68 MMHG

## 2023-08-10 DIAGNOSIS — Z86.73 HISTORY OF CVA (CEREBROVASCULAR ACCIDENT): ICD-10-CM

## 2023-08-10 DIAGNOSIS — R73.09 ELEVATED GLUCOSE: ICD-10-CM

## 2023-08-10 DIAGNOSIS — N13.8 BENIGN PROSTATIC HYPERPLASIA WITH URINARY OBSTRUCTION: ICD-10-CM

## 2023-08-10 DIAGNOSIS — R79.89 LOW TSH LEVEL: ICD-10-CM

## 2023-08-10 DIAGNOSIS — I10 ESSENTIAL HYPERTENSION: Primary | ICD-10-CM

## 2023-08-10 DIAGNOSIS — N40.1 BENIGN PROSTATIC HYPERPLASIA WITH URINARY OBSTRUCTION: ICD-10-CM

## 2023-08-10 PROCEDURE — 1123F ACP DISCUSS/DSCN MKR DOCD: CPT | Performed by: STUDENT IN AN ORGANIZED HEALTH CARE EDUCATION/TRAINING PROGRAM

## 2023-08-10 PROCEDURE — 99213 OFFICE O/P EST LOW 20 MIN: CPT | Performed by: STUDENT IN AN ORGANIZED HEALTH CARE EDUCATION/TRAINING PROGRAM

## 2023-08-10 PROCEDURE — 99214 OFFICE O/P EST MOD 30 MIN: CPT | Performed by: STUDENT IN AN ORGANIZED HEALTH CARE EDUCATION/TRAINING PROGRAM

## 2023-08-10 PROCEDURE — 3074F SYST BP LT 130 MM HG: CPT | Performed by: STUDENT IN AN ORGANIZED HEALTH CARE EDUCATION/TRAINING PROGRAM

## 2023-08-10 PROCEDURE — 3078F DIAST BP <80 MM HG: CPT | Performed by: STUDENT IN AN ORGANIZED HEALTH CARE EDUCATION/TRAINING PROGRAM

## 2023-08-10 SDOH — ECONOMIC STABILITY: FOOD INSECURITY: WITHIN THE PAST 12 MONTHS, THE FOOD YOU BOUGHT JUST DIDN'T LAST AND YOU DIDN'T HAVE MONEY TO GET MORE.: NEVER TRUE

## 2023-08-10 SDOH — ECONOMIC STABILITY: HOUSING INSECURITY
IN THE LAST 12 MONTHS, WAS THERE A TIME WHEN YOU DID NOT HAVE A STEADY PLACE TO SLEEP OR SLEPT IN A SHELTER (INCLUDING NOW)?: NO

## 2023-08-10 SDOH — ECONOMIC STABILITY: INCOME INSECURITY: HOW HARD IS IT FOR YOU TO PAY FOR THE VERY BASICS LIKE FOOD, HOUSING, MEDICAL CARE, AND HEATING?: NOT HARD AT ALL

## 2023-08-10 SDOH — ECONOMIC STABILITY: FOOD INSECURITY: WITHIN THE PAST 12 MONTHS, YOU WORRIED THAT YOUR FOOD WOULD RUN OUT BEFORE YOU GOT MONEY TO BUY MORE.: NEVER TRUE

## 2023-08-10 NOTE — PROGRESS NOTES
[address]  [address] [place] [address]  Dept: [phone]  Dept Fax: [phone]  Loc: [phone]      Tino Rich is a 74 y.o. male who presents today for:  Chief Complaint   Patient presents with    Hypertension    Discuss Labs     Discuss thyroid labs done on         Goals    None         HPI:     HPI  Patient is a 75-year-old male who presents to the office today for chronic condition follow-up. Patient denies any major concerns at this time. History of hypertension blood pressure mildly elevated today at 140/68. States that he otherwise has been feeling fairly well. Patient had blood work completed back in April that he wanted to discuss. He is due for repeat blood work at this time. TSH in April showed low at 0.164. No other major concerns today. Has not followed up with neurology from his CVA. Needs neurology follow-up at this time. Overall denies any neurological complaints today.     Past Medical History:   Diagnosis Date    Chronic back pain       Past Surgical History:   Procedure Laterality Date    APPENDECTOMY      BRAIN ANEURYSM SURGERY      UNKNOWN BRAIN ANEURYSM CLIP FROM 20+ YEARS AGO, NO INFORMATION, NO MRI - [facility] 22    CHOLECYSTECTOMY      HERNIA REPAIR      ROTATOR CUFF REPAIR      TONSILLECTOMY       Family History   Problem Relation Age of Onset    Gall Bladder Disease Mother     Lung Cancer Father     Cancer Sister     Kidney Disease Sister      Social History     Tobacco Use    Smoking status: Former     Packs/day: 0.50     Years: 20.00     Pack years: 10.00     Types: Cigarettes     Quit date:      Years since quittin.6    Smokeless tobacco: Never   Substance Use Topics    Alcohol use: Not Currently      Current Outpatient Medications   Medication Sig Dispense Refill    meloxicam (MOBIC) 15 MG tablet Take 1 tablet by mouth daily 30 tablet 0    sildenafil (VIAGRA)

## 2023-08-28 ASSESSMENT — ENCOUNTER SYMPTOMS
CONSTIPATION: 0
DIARRHEA: 0
EYE PAIN: 0
SHORTNESS OF BREATH: 0
TROUBLE SWALLOWING: 0
VOMITING: 0
COUGH: 0
NAUSEA: 0
BLOOD IN STOOL: 0
ABDOMINAL PAIN: 0

## 2023-08-30 DIAGNOSIS — M54.50 CHRONIC BILATERAL LOW BACK PAIN, UNSPECIFIED WHETHER SCIATICA PRESENT: ICD-10-CM

## 2023-08-30 DIAGNOSIS — G89.29 CHRONIC BILATERAL LOW BACK PAIN, UNSPECIFIED WHETHER SCIATICA PRESENT: ICD-10-CM

## 2023-08-30 RX ORDER — MELOXICAM 15 MG/1
TABLET ORAL
Qty: 30 TABLET | Refills: 0 | Status: SHIPPED | OUTPATIENT
Start: 2023-08-30

## 2023-08-30 NOTE — TELEPHONE ENCOUNTER
Yoshi Shah called requesting a refill of the below medication which has been pended for you:     Requested Prescriptions     Pending Prescriptions Disp Refills    meloxicam (MOBIC) 15 MG tablet [Pharmacy Med Name: MELOXICAM 15 MG TABLET] 30 tablet 0     Sig: TAKE 1 TABLET BY MOUTH EVERY DAY       Last Appointment Date: Visit date not found  Next Appointment Date: Visit date not found    No Known Allergies

## 2023-09-20 RX ORDER — PANTOPRAZOLE SODIUM 40 MG/1
40 TABLET, DELAYED RELEASE ORAL DAILY
Qty: 30 TABLET | Refills: 3 | Status: SHIPPED | OUTPATIENT
Start: 2023-09-20

## 2023-09-20 NOTE — TELEPHONE ENCOUNTER
Susan Scott called requesting a refill of the below medication which has been pended for you:     Requested Prescriptions     Pending Prescriptions Disp Refills    pantoprazole (PROTONIX) 40 MG tablet 30 tablet 3     Sig: Take 1 tablet by mouth daily       Last Appointment Date: 8/10/2023  Next Appointment Date: 2/12/2024    No Known Allergies

## 2023-09-22 ENCOUNTER — OFFICE VISIT (OUTPATIENT)
Dept: NEUROLOGY | Age: 73
End: 2023-09-22
Payer: MEDICARE

## 2023-09-22 VITALS
WEIGHT: 166 LBS | TEMPERATURE: 98 F | SYSTOLIC BLOOD PRESSURE: 138 MMHG | OXYGEN SATURATION: 98 % | BODY MASS INDEX: 26.68 KG/M2 | HEART RATE: 60 BPM | DIASTOLIC BLOOD PRESSURE: 72 MMHG | HEIGHT: 66 IN

## 2023-09-22 DIAGNOSIS — Z86.79 H/O CEREBRAL ANEURYSM REPAIR: ICD-10-CM

## 2023-09-22 DIAGNOSIS — G81.94 LEFT HEMIPARESIS (HCC): ICD-10-CM

## 2023-09-22 DIAGNOSIS — I63.81 THALAMIC STROKE (HCC): Primary | ICD-10-CM

## 2023-09-22 DIAGNOSIS — I63.411 CEREBRAL INFARCTION DUE TO EMBOLISM OF RIGHT MIDDLE CEREBRAL ARTERY (HCC): ICD-10-CM

## 2023-09-22 DIAGNOSIS — I10 PRIMARY HYPERTENSION: ICD-10-CM

## 2023-09-22 DIAGNOSIS — Z86.73 H/O: STROKE: ICD-10-CM

## 2023-09-22 DIAGNOSIS — M48.062 LUMBAR STENOSIS WITH NEUROGENIC CLAUDICATION: ICD-10-CM

## 2023-09-22 DIAGNOSIS — Z86.79 HISTORY OF CEREBRAL ANEURYSM REPAIR: ICD-10-CM

## 2023-09-22 DIAGNOSIS — I65.01 OCCLUSION OF RIGHT VERTEBRAL ARTERY: ICD-10-CM

## 2023-09-22 DIAGNOSIS — M51.36 DEGENERATIVE DISC DISEASE, LUMBAR: ICD-10-CM

## 2023-09-22 DIAGNOSIS — Z98.890 H/O CEREBRAL ANEURYSM REPAIR: ICD-10-CM

## 2023-09-22 DIAGNOSIS — M54.16 LUMBAR RADICULOPATHY: ICD-10-CM

## 2023-09-22 DIAGNOSIS — Z98.890 HISTORY OF CEREBRAL ANEURYSM REPAIR: ICD-10-CM

## 2023-09-22 PROCEDURE — 3078F DIAST BP <80 MM HG: CPT | Performed by: PSYCHIATRY & NEUROLOGY

## 2023-09-22 PROCEDURE — 99213 OFFICE O/P EST LOW 20 MIN: CPT | Performed by: PSYCHIATRY & NEUROLOGY

## 2023-09-22 PROCEDURE — 99205 OFFICE O/P NEW HI 60 MIN: CPT | Performed by: PSYCHIATRY & NEUROLOGY

## 2023-09-22 PROCEDURE — 3075F SYST BP GE 130 - 139MM HG: CPT | Performed by: PSYCHIATRY & NEUROLOGY

## 2023-09-22 RX ORDER — AMOXICILLIN AND CLAVULANATE POTASSIUM 875; 125 MG/1; MG/1
1 TABLET, FILM COATED ORAL 2 TIMES DAILY
COMMUNITY
Start: 2023-09-12

## 2023-09-22 ASSESSMENT — ENCOUNTER SYMPTOMS
NAUSEA: 0
BLOOD IN STOOL: 0
EYE PAIN: 0
DIARRHEA: 0
COLOR CHANGE: 0
VISUAL CHANGE: 0
COUGH: 0
CONSTIPATION: 0
WHEEZING: 0
EYE REDNESS: 0
PHOTOPHOBIA: 0
TROUBLE SWALLOWING: 0
BACK PAIN: 0
ABDOMINAL PAIN: 0
VOICE CHANGE: 0
SHORTNESS OF BREATH: 0
SORE THROAT: 0
SINUS PRESSURE: 0
ABDOMINAL DISTENTION: 0
EYE ITCHING: 0
APNEA: 0
CHOKING: 0
FACIAL SWELLING: 0
CHEST TIGHTNESS: 0
EYE DISCHARGE: 0
VOMITING: 0

## 2023-09-22 NOTE — PROGRESS NOTES
Pt needs to switch insurance prior to testing and r/s for f/u. Pt to contact office with new insurance info.

## 2023-09-22 NOTE — PROGRESS NOTES
[facility]  Neurology    [address], [place]  [facility]:[phone]   Fax: [phone]        SUBJECTIVE:       PATIENT ID:  Collin Stein is a  RIGHT     HANDED 74 y.o. male. Neurologic Problem  The patient's pertinent negatives include no clumsiness, focal sensory loss, focal weakness, loss of balance, memory loss, near-syncope, slurred speech, syncope, visual change or weakness. Primary symptoms comment: PREVIOUS     H/O     RIGHT   THALAMIC    STROKE    IN    AUG.  2022     PREVIOUS    H/O      RIGHT   MCA   ANEURYSMAL   CLIPPING   IN   2000. This is a chronic problem. Associated symptoms include dizziness and headaches. Pertinent negatives include no abdominal pain, back pain, chest pain, confusion, fatigue, fever, light-headedness, nausea, neck pain, palpitations, shortness of breath or vomiting. Past treatments include bed rest, aspirin and sleep. The treatment provided significant relief. His past medical history is significant for a CVA. There is no history of a bleeding disorder, a clotting disorder, dementia, head trauma, liver disease, mood changes or seizures. History obtained from  The   PATIENT         and other  available   medical  records   were  Also  reviewed. The  Duration,  Quality,  Severity,  Location,  Timing,  Context,  Modifying  Factors   Of   The   Chief   Complaint       And  Present  Illness  Was   Reviewed   In   Chronological   [place].                                             PATIENT'S  MAIN  CONCERNS INCLUDE :                     1)      PREVIOUS     H/O     RIGHT   THALAMIC    STROKE    IN    AUG.  2022                                     WITH  HOSPITALIZATION   IN   [place]                             2)      PATIENT    RECOVERED   WELL                                   PREVIOUS     LEFT  HEMIPARESIS   -    RESOLVED                          3)    CT  HEAD   AND   CTA    HEAD     IN    AUG.  2022 /  SEPT. 2022

## 2023-10-03 DIAGNOSIS — G89.29 CHRONIC BILATERAL LOW BACK PAIN, UNSPECIFIED WHETHER SCIATICA PRESENT: ICD-10-CM

## 2023-10-03 DIAGNOSIS — M54.50 CHRONIC BILATERAL LOW BACK PAIN, UNSPECIFIED WHETHER SCIATICA PRESENT: ICD-10-CM

## 2023-10-03 RX ORDER — MELOXICAM 15 MG/1
TABLET ORAL
Qty: 30 TABLET | Refills: 2 | Status: SHIPPED | OUTPATIENT
Start: 2023-10-03

## 2023-10-03 NOTE — TELEPHONE ENCOUNTER
Guille Kim called requesting a refill of the below medication which has been pended for you:     Requested Prescriptions     Pending Prescriptions Disp Refills    meloxicam (MOBIC) 15 MG tablet [Pharmacy Med Name: MELOXICAM 15 MG TABLET] 30 tablet 0     Sig: TAKE 1 TABLET BY MOUTH EVERY DAY       Last Appointment Date: 8/10/2023  Next Appointment Date: 2/12/2024    No Known Allergies

## 2023-10-18 ENCOUNTER — HOSPITAL ENCOUNTER (OUTPATIENT)
Dept: INTERVENTIONAL RADIOLOGY/VASCULAR | Age: 73
Discharge: HOME | End: 2023-10-20
Attending: PSYCHIATRY & NEUROLOGY
Payer: MEDICARE

## 2023-10-18 DIAGNOSIS — G81.94 LEFT HEMIPARESIS (HCC): ICD-10-CM

## 2023-10-18 DIAGNOSIS — I63.81 THALAMIC STROKE (HCC): ICD-10-CM

## 2023-10-18 DIAGNOSIS — Z86.73 H/O: STROKE: ICD-10-CM

## 2023-10-18 DIAGNOSIS — I63.411 CEREBRAL INFARCTION DUE TO EMBOLISM OF RIGHT MIDDLE CEREBRAL ARTERY (HCC): ICD-10-CM

## 2023-10-18 PROCEDURE — 93880 EXTRACRANIAL BILAT STUDY: CPT

## 2023-10-30 ENCOUNTER — NURSE TRIAGE (OUTPATIENT)
Dept: OTHER | Facility: CLINIC | Age: 73
End: 2023-10-30

## 2023-10-30 NOTE — TELEPHONE ENCOUNTER
Location of patient: [address] call from [place] at [facility] with [employer]. Subjective: Caller states \"I have been having abdominal pain\"     Current Symptoms: Feels bloated to where makes it hard to eat and to button his pants. Cramping. Worsening the past few days. No diarrhea. Onset: a few months ago; worsening    Associated Symptoms: reduced activity, reduced appetite    Pain Severity: 0/10 for pain, just cramping, uncomfortable;     Temperature: denies fever     What has been tried: OTC antacids     LMP: NA Pregnant: NA    Recommended disposition:  No Contact Call    Care advice provided, patient verbalizes understanding; denies any other questions or concerns; instructed to call back for any new or worsening symptoms. As triage RN was triaging, caller said something that sounded like \"bye\" and thought he may have been talking to someone in the home but not for sure and then the call disconnected. Tried to call him back x2. Left message for him to call back. Attention Provider: Thank you for allowing me to participate in the care of your patient. The patient was connected to triage in response to information provided to the E[facility]/PSC. Please do not respond through this encounter as the response is not directed to a shared pool.     Reason for Disposition   Unable to complete triage due to phone connection issues    Protocols used: No Contact or Duplicate Contact Call-ADULT-[place]

## 2024-04-01 ENCOUNTER — HOSPITAL ENCOUNTER (OUTPATIENT)
Dept: NEUROLOGY | Age: 74
Discharge: HOME | End: 2024-04-01
Attending: PSYCHIATRY & NEUROLOGY
Payer: MEDICARE

## 2024-04-01 DIAGNOSIS — I63.81 THALAMIC STROKE (HCC): ICD-10-CM

## 2024-04-01 DIAGNOSIS — Z86.73 H/O: STROKE: ICD-10-CM

## 2024-04-01 DIAGNOSIS — I63.411 CEREBRAL INFARCTION DUE TO EMBOLISM OF RIGHT MIDDLE CEREBRAL ARTERY (HCC): ICD-10-CM

## 2024-04-01 DIAGNOSIS — G81.94 LEFT HEMIPARESIS (HCC): ICD-10-CM

## 2024-04-01 PROCEDURE — 93886 INTRACRANIAL COMPLETE STUDY: CPT

## 2024-04-01 PROCEDURE — 93892 TCD EMBOLI DETECT W/O INJ: CPT

## 2024-04-01 NOTE — PROGRESS NOTES
TCD Completed with Emboli Detection.    PCP: Everardo Billings DO    Ordering: Baljeet Richard Neurologist    Interpreting Physician: Adenike Marsh    Electronically signed by Jane Danielle RN on 4/1/2024 at 3:25 PM

## 2024-04-03 NOTE — PROCEDURES
[facility]                [address] [place] [address]                    TRANSCRANIAL DOPPLER (TCD) STUDY      PATIENT NAME: MARTHA MONTESINOS                  : 1950  MED REC NO: [id number]                         ROOM:   ACCOUNT NO: [id number]                       ADMIT DATE: 2024    PROVIDER: Baljeet Richard MD        TECHNIQUE:  Transcranial Doppler study of intracranial arteries was performed using [employer] equipment with digital Doppler technology, with high resolution 250 Cotter M-mode display and Multigate Spectral Windows and 2 MHz Doppler probes via temporal, suboccipital, and orbital approaches.    Transcranial Doppler study of the intracranial arteries was also performed for emboli detection without intravenous microbubble injection using continuous soundtrack, M-mode with multigate spectral displays and soundtrack displays with continuous bilateral monitoring.      CLINICAL DATA:      The patient is 73 years old with history of stroke, mild left hemiparesis, history of cerebral aneurysmal repair, hypertension, history of thalamic stroke.    The purpose of the study is to evaluate for stroke, intracranial focal stenosis, flow abnormalities, and vertebrobasilar insufficiency evaluations.    SUMMARY:      The mean flow velocities in the right and left anterior middle and posterior cerebral arteries are significantly low with elevated PI values.    Mean flow velocities in the right vertebral artery are low.  Left vertebral artery within normal limits with elevated PI values.  Mean flow velocities in the basilar artery are low with elevated PI values.      TCD OF INTRACRANIAL ARTERIES FOR EMBOLI DETECTION:      Review and analysis of the waveforms and continuous soundtrack systems during the current monitoring show no evidence of HITS (high intensity transient signals) and no embolic shower events were detected.      IMPRESSION:      1. There is moderate

## 2024-05-08 ENCOUNTER — OFFICE VISIT (OUTPATIENT)
Dept: NEUROLOGY | Age: 74
End: 2024-05-08
Payer: MEDICARE

## 2024-05-08 VITALS
OXYGEN SATURATION: 98 % | RESPIRATION RATE: 16 BRPM | SYSTOLIC BLOOD PRESSURE: 138 MMHG | BODY MASS INDEX: 24.86 KG/M2 | DIASTOLIC BLOOD PRESSURE: 72 MMHG | WEIGHT: 154 LBS | HEART RATE: 62 BPM

## 2024-05-08 DIAGNOSIS — Z86.79 H/O CEREBRAL ANEURYSM REPAIR: ICD-10-CM

## 2024-05-08 DIAGNOSIS — I65.01 OCCLUSION OF RIGHT VERTEBRAL ARTERY: ICD-10-CM

## 2024-05-08 DIAGNOSIS — M54.16 LUMBAR RADICULOPATHY: ICD-10-CM

## 2024-05-08 DIAGNOSIS — Z86.73 H/O: STROKE: ICD-10-CM

## 2024-05-08 DIAGNOSIS — I63.81 RIGHT THALAMIC STROKE (HCC): Primary | ICD-10-CM

## 2024-05-08 DIAGNOSIS — I67.82 CHRONIC CEREBRAL ISCHEMIA: ICD-10-CM

## 2024-05-08 DIAGNOSIS — I65.03 STENOSIS OF BOTH VERTEBROBASILAR ARTERIES: ICD-10-CM

## 2024-05-08 DIAGNOSIS — Z98.890 H/O CEREBRAL ANEURYSM REPAIR: ICD-10-CM

## 2024-05-08 PROCEDURE — 3075F SYST BP GE 130 - 139MM HG: CPT | Performed by: PSYCHIATRY & NEUROLOGY

## 2024-05-08 PROCEDURE — 99214 OFFICE O/P EST MOD 30 MIN: CPT | Performed by: PSYCHIATRY & NEUROLOGY

## 2024-05-08 PROCEDURE — 1123F ACP DISCUSS/DSCN MKR DOCD: CPT | Performed by: PSYCHIATRY & NEUROLOGY

## 2024-05-08 PROCEDURE — 3078F DIAST BP <80 MM HG: CPT | Performed by: PSYCHIATRY & NEUROLOGY

## 2024-05-08 RX ORDER — ASPIRIN 81 MG/1
81 TABLET, CHEWABLE ORAL DAILY
COMMUNITY
Start: 2024-02-26

## 2024-05-08 ASSESSMENT — PATIENT HEALTH QUESTIONNAIRE - PHQ9
SUM OF ALL RESPONSES TO PHQ QUESTIONS 1-9: 0
1. LITTLE INTEREST OR PLEASURE IN DOING THINGS: NOT AT ALL
2. FEELING DOWN, DEPRESSED OR HOPELESS: NOT AT ALL
SUM OF ALL RESPONSES TO PHQ QUESTIONS 1-9: 0
SUM OF ALL RESPONSES TO PHQ QUESTIONS 1-9: 0
SUM OF ALL RESPONSES TO PHQ9 QUESTIONS 1 & 2: 0
SUM OF ALL RESPONSES TO PHQ QUESTIONS 1-9: 0

## 2024-05-08 ASSESSMENT — ENCOUNTER SYMPTOMS
CHOKING: 0
COLOR CHANGE: 0
SINUS PRESSURE: 0
TROUBLE SWALLOWING: 0
PHOTOPHOBIA: 0
FACIAL SWELLING: 0
SHORTNESS OF BREATH: 0
BLOOD IN STOOL: 0
EYE PAIN: 0
BACK PAIN: 0
VOICE CHANGE: 0
APNEA: 0
EYE DISCHARGE: 0
CONSTIPATION: 0
DIARRHEA: 0
CHEST TIGHTNESS: 0
ABDOMINAL DISTENTION: 0
EYE ITCHING: 0
EYE REDNESS: 0
WHEEZING: 0

## 2024-05-08 NOTE — PROGRESS NOTES
Refer   To    Them for   Additional    Information.                    Any components  That are either  Unobtainable  Or  Limited  In   HPI, ROS  And/or PFSH   Are                   Due   ToPatient's  Medical  Problems,  Clinical  Condition   and/or lack of                                 other    Alternate   resources.            RECORDS   REVIEWED:    historical medical records           INFORMATION   REVIEWED:     MEDICAL   HISTORY,SURGICAL   HISTORY,     MEDICATIONS   LIST,   ALLERGIES AND  DRUG  INTOLERANCES,       FAMILY   HISTORY,  SOCIAL  HISTORY,      PROBLEM  LIST   FOR  PATIENT  CARE   COORDINATION          Past Medical History:   Diagnosis Date    Chronic back pain          Past Surgical History:   Procedure Laterality Date    APPENDECTOMY      BRAIN ANEURYSM SURGERY      UNKNOWN BRAIN ANEURYSM CLIP FROM 20+ YEARS AGO, NO INFORMATION, NO MRI - [facility] 9/1/22    CHOLECYSTECTOMY      HERNIA REPAIR      ROTATOR CUFF REPAIR      TONSILLECTOMY           Current Outpatient Medications   Medication Sig Dispense Refill    aspirin 81 MG chewable tablet Take 1 tablet by mouth daily      pantoprazole (PROTONIX) 40 MG tablet TAKE 1 TABLET BY MOUTH EVERY DAY 90 tablet 1    meloxicam (MOBIC) 15 MG tablet TAKE 1 TABLET BY MOUTH EVERY DAY 30 tablet 2    Multiple Vitamins-Iron (MULTI-VITAMIN/IRON PO) Take by mouth      rosuvastatin (CRESTOR) 20 MG tablet Take 1 tablet by mouth at bedtime      losartan (COZAAR) 50 MG tablet Take 1 tablet by mouth daily      vitamin D3 (CHOLECALCIFEROL) 25 MCG (1000 UT) TABS tablet Take 1 tablet by mouth daily      B Complex Vitamins (B COMPLEX 1 PO) Take 1 tablet by mouth daily      cyclobenzaprine (FLEXERIL) 10 MG tablet Take 1 tablet by mouth daily as needed for Muscle spasms      sildenafil (VIAGRA) 100 MG tablet Take 1 tablet by mouth daily as needed for Erectile Dysfunction 30 tablet 3    tamsulosin (FLOMAX) 0.4 MG capsule Take 1 capsule by mouth daily (Patient not taking: Reported on

## 2024-05-22 NOTE — H&P PST ADULT - GASTROINTESTINAL COMMENTS
Problem: Adult Inpatient Plan of Care  Goal: Plan of Care Review  Outcome: Ongoing, Progressing  Goal: Patient-Specific Goal (Individualized)  Outcome: Ongoing, Progressing  Goal: Absence of Hospital-Acquired Illness or Injury  Outcome: Ongoing, Progressing  Intervention: Identify and Manage Fall Risk  Recent Flowsheet Documentation  Taken 5/22/2024 0400 by Chay Yi RN  Safety Promotion/Fall Prevention:   clutter free environment maintained   assistive device/personal items within reach   safety round/check completed  Taken 5/22/2024 0200 by Chay Yi RN  Safety Promotion/Fall Prevention:   clutter free environment maintained   assistive device/personal items within reach   safety round/check completed  Taken 5/22/2024 0045 by Chay Yi RN  Safety Promotion/Fall Prevention:   clutter free environment maintained   assistive device/personal items within reach   safety round/check completed  Taken 5/21/2024 2200 by Chay Yi RN  Safety Promotion/Fall Prevention:   clutter free environment maintained   assistive device/personal items within reach   safety round/check completed  Taken 5/21/2024 2013 by Chay Yi RN  Safety Promotion/Fall Prevention:   assistive device/personal items within reach   clutter free environment maintained   safety round/check completed  Intervention: Prevent Skin Injury  Recent Flowsheet Documentation  Taken 5/22/2024 0400 by Chay Yi RN  Body Position: position changed independently  Taken 5/22/2024 0200 by Chay Yi RN  Body Position: position changed independently  Taken 5/22/2024 0045 by Chay Yi RN  Body Position: position changed independently  Skin Protection: adhesive use limited  Taken 5/21/2024 2200 by Chay Yi RN  Body Position: position changed independently  Taken 5/21/2024 2013 by Chay Yi RN  Body Position: position changed independently  Skin Protection: adhesive use limited  Intervention: Prevent and  Manage VTE (Venous Thromboembolism) Risk  Recent Flowsheet Documentation  Taken 5/22/2024 0400 by Chay Yi, RN  Activity Management: activity minimized  Taken 5/22/2024 0200 by Chay Yi RN  Activity Management: activity minimized  Taken 5/22/2024 0045 by Chay Yi RN  Activity Management: activity encouraged  Taken 5/21/2024 2200 by Chay Yi RN  Activity Management: activity encouraged  Taken 5/21/2024 2013 by Chay Yi RN  Activity Management: activity encouraged  Range of Motion: active ROM (range of motion) encouraged  Intervention: Prevent Infection  Recent Flowsheet Documentation  Taken 5/22/2024 0400 by Chay Yi RN  Infection Prevention:   environmental surveillance performed   hand hygiene promoted   rest/sleep promoted  Taken 5/22/2024 0200 by Chay Yi RN  Infection Prevention:   environmental surveillance performed   hand hygiene promoted   rest/sleep promoted  Taken 5/22/2024 0045 by Chay Yi RN  Infection Prevention:   environmental surveillance performed   hand hygiene promoted   rest/sleep promoted  Taken 5/21/2024 2200 by Chay Yi RN  Infection Prevention:   environmental surveillance performed   hand hygiene promoted   rest/sleep promoted  Taken 5/21/2024 2013 by Chay Yi RN  Infection Prevention:   environmental surveillance performed   hand hygiene promoted   rest/sleep promoted  Goal: Optimal Comfort and Wellbeing  Outcome: Ongoing, Progressing  Intervention: Provide Person-Centered Care  Recent Flowsheet Documentation  Taken 5/21/2024 2013 by Chay Yi RN  Trust Relationship/Rapport:   care explained   choices provided  Goal: Readiness for Transition of Care  Outcome: Ongoing, Progressing  Intervention: Mutually Develop Transition Plan  Recent Flowsheet Documentation  Taken 5/21/2024 2029 by Chay Yi RN  Equipment Currently Used at Home: none  Taken 5/21/2024 2020 by Chay Yi, RN  Transportation  Anticipated: family or friend will provide  Patient/Family Anticipated Services at Transition: none  Patient/Family Anticipates Transition to: home with family   Goal Outcome Evaluation:                                               bowel movement today

## 2024-06-13 RX ORDER — PANTOPRAZOLE SODIUM 40 MG/1
40 TABLET, DELAYED RELEASE ORAL DAILY
Qty: 90 TABLET | Refills: 1 | OUTPATIENT
Start: 2024-06-13